# Patient Record
Sex: MALE | Race: BLACK OR AFRICAN AMERICAN | NOT HISPANIC OR LATINO | Employment: FULL TIME | URBAN - METROPOLITAN AREA
[De-identification: names, ages, dates, MRNs, and addresses within clinical notes are randomized per-mention and may not be internally consistent; named-entity substitution may affect disease eponyms.]

---

## 2017-07-13 ENCOUNTER — OFFICE VISIT (OUTPATIENT)
Dept: INTERNAL MEDICINE | Facility: CLINIC | Age: 37
End: 2017-07-13
Payer: COMMERCIAL

## 2017-07-13 VITALS
DIASTOLIC BLOOD PRESSURE: 106 MMHG | HEIGHT: 74 IN | SYSTOLIC BLOOD PRESSURE: 170 MMHG | WEIGHT: 304.25 LBS | HEART RATE: 72 BPM | BODY MASS INDEX: 39.05 KG/M2

## 2017-07-13 DIAGNOSIS — E55.9 VITAMIN D DEFICIENCY DISEASE: ICD-10-CM

## 2017-07-13 DIAGNOSIS — E66.01 SEVERE OBESITY WITH BODY MASS INDEX (BMI) OF 35.0 TO 39.9 WITH COMORBIDITY: ICD-10-CM

## 2017-07-13 DIAGNOSIS — I10 HTN (HYPERTENSION), BENIGN: ICD-10-CM

## 2017-07-13 DIAGNOSIS — R06.83 SNORING: ICD-10-CM

## 2017-07-13 DIAGNOSIS — E78.2 MIXED DYSLIPIDEMIA: ICD-10-CM

## 2017-07-13 PROCEDURE — 99999 PR PBB SHADOW E&M-EST. PATIENT-LVL IV: CPT | Mod: PBBFAC,,, | Performed by: INTERNAL MEDICINE

## 2017-07-13 PROCEDURE — 99204 OFFICE O/P NEW MOD 45 MIN: CPT | Mod: S$GLB,,, | Performed by: INTERNAL MEDICINE

## 2017-07-13 PROCEDURE — 4010F ACE/ARB THERAPY RXD/TAKEN: CPT | Mod: S$GLB,,, | Performed by: INTERNAL MEDICINE

## 2017-07-13 RX ORDER — LANCETS
1 EACH MISCELLANEOUS DAILY
Qty: 100 EACH | Refills: 11 | Status: SHIPPED | OUTPATIENT
Start: 2017-07-13

## 2017-07-13 RX ORDER — METFORMIN HYDROCHLORIDE 500 MG/1
1000 TABLET ORAL 2 TIMES DAILY
Qty: 360 TABLET | Refills: 1 | Status: SHIPPED | OUTPATIENT
Start: 2017-07-13 | End: 2018-10-12 | Stop reason: ALTCHOICE

## 2017-07-13 RX ORDER — ATORVASTATIN CALCIUM 40 MG/1
40 TABLET, FILM COATED ORAL DAILY
Qty: 90 TABLET | Refills: 3 | Status: SHIPPED | OUTPATIENT
Start: 2017-07-13 | End: 2018-10-12 | Stop reason: SDUPTHER

## 2017-07-13 RX ORDER — INSULIN PUMP SYRINGE, 3 ML
EACH MISCELLANEOUS
Qty: 1 EACH | Refills: 0 | Status: SHIPPED | OUTPATIENT
Start: 2017-07-13 | End: 2021-03-17

## 2017-07-13 RX ORDER — LANCING DEVICE
1 EACH MISCELLANEOUS DAILY
Qty: 1 EACH | Refills: 0 | Status: SHIPPED | OUTPATIENT
Start: 2017-07-13 | End: 2021-02-03

## 2017-07-13 RX ORDER — CHOLECALCIFEROL (VITAMIN D3) 125 MCG
5000 CAPSULE ORAL DAILY
Qty: 100 CAPSULE | Refills: 2 | COMMUNITY
Start: 2017-07-13 | End: 2018-10-12 | Stop reason: ALTCHOICE

## 2017-07-13 NOTE — PATIENT INSTRUCTIONS
Blood Pressure Measurement:    -- Please record your blood pressure every day. When checking, make sure you have been sitting for about 5 minutes, your legs are uncrossed, and the blood pressure cuff at the level of your heart. Record your blood pressure with the date and time in a log and bring it with you to every doctor's visit.  -- Goal blood pressure is top number less than 130 and bottom number less than 80.  -- If your blood pressure is >160/>100 on two consecutive occasions, contact the office. If it is >180/>120 and you are having confusion, chest pain or back pain, shortness of breath, or blood in the urine, go to the emergency room immediately.        Established High Blood Pressure    High blood pressure (hypertension) is a chronic disease. Often health care providers dont know what causes it. But it can be caused by certain health conditions and medicines.  If you have high blood pressure, you may not have any symptoms. If you do have symptoms, they may include headache, dizziness, changes in your vision, chest pain, and shortness of breath. But even without symptoms, high blood pressure thats not treated raises your risk for heart attack and stroke. High blood pressure is a serious health risk and shouldnt be ignored.  A blood pressure reading is made up of two numbers: a higher number over a lower number. The top number is the systolic pressure. The bottom number is the diastolic pressure. A normal blood pressure is less than 120 over less than 80.  High blood pressure is when either the top number is 140 or higher, or the bottom number is 90 or higher. This must be the result when taking your blood pressure a number of times. The blood pressures between normal and high are called prehypertension.  Home care  If you have high blood pressure, you should do what is listed below to lower your blood pressure. If you are taking medicines for high blood pressure, these methods may reduce or end your  need for medicines in the future.  · Begin a weight-loss program if you are overweight.  · Cut back on how much salt you get in your diet. Heres how to do this:  ¨ Dont eat foods that have a lot of salt. These include olives, pickles, smoked meats, and salted potato chips.  ¨ Dont add salt to your food at the table.  ¨ Use only small amounts of salt when cooking.  · Begin an exercise program. Talk with your health care provider about the type of exercise program that would be best for you. It doesn't have to be hard. Even brisk walking for 20 minutes 3 times a week is a good form of exercise.  · Dont take medicines that have heart stimulants. This includes many cold and sinus decongestant pills and sprays, as well as diet pills. Check the warnings about hypertension on the label. Stimulants such as amphetamine or cocaine could be lethal for someone with high blood pressure. Never take these.  · Limit how much caffeine you get in your diet. Switch to caffeine-free products.  · Stop smoking. If you are a long-time smoker, this can be hard. Enroll in a stop-smoking program to make it more likely that you will quit for good.  · Learn how to handle stress. This is an important part of any program to lower blood pressure. Learn about relaxation methods like meditation, yoga, or biofeedback.  · If your provider prescribed medicines, take them exactly as directed. Missing doses may cause your blood pressure get out of control.  · Consider buying an automatic blood pressure machine. You can get one of these at most pharmacies. Use this to watch your blood pressure at home. Give the results to your provider.  Follow-up care  You will need to make regular visits to your health care provider. This is to check your blood pressure and to make changes to your medicines. Make a follow-up appointment as directed.  When to seek medical advice  Call your health care provider right away if any of these occur:  · Chest pain or  shortness of breath  · Severe headache  · Throbbing or rushing sound in the ears  · Nosebleed  · Sudden severe pain in your belly (abdomen)  · Extreme drowsiness, confusion, or fainting  · Dizziness or dizziness with a spinning sensation (vertigo)  · Weakness of an arm or leg or one side of the face  · You have problems speaking or seeing   Date Last Reviewed: 11/25/2014  © 6239-8846 [employer]. [address] [place] [address], [place] [address]. All rights reserved. This information is not intended as a substitute for professional medical care. Always follow your healthcare professional's instructions.      Low-Salt Diet  This diet removes foods that are high in salt. It also limits the amount of salt you use when cooking. It is most often used for people with high blood pressure, edema (fluid retention), and kidney, liver, or heart disease.  Table salt contains the mineral sodium. Your body needs sodium to work normally. But too much sodium can make your health problems worse. Your healthcare provider is recommending a low-salt (also called low-sodium) diet for you. Your total daily allowance of salt is 1,500 to 2,300 milligrams (mg). It is less than 1 teaspoon of table salt. This means you can have only about 500 to 700 mg of sodium at each meal. People with certain health problems should limit salt intake to the lower end of the recommended range.    When you cook, dont add much salt. If you can cook without using salt, even better. Dont add salt to your food at the table.  When shopping, read food labels. Salt is often called sodium on the label. Choose foods that are salt-free, low salt, or very low salt. Note that foods with reduced salt may not lower your salt intake enough.    Beans, potatoes, and pasta  Ok: Dry beans, split peas, lentils, potatoes, rice, macaroni, pasta, spaghetti without added salt  Avoid: Potato chips, tortilla chips, and similar products  Breads and cereals  Ok: Low-sodium  breads, rolls, cereals, and cakes; low-salt crackers, matzo crackers  Avoid: Salted crackers, pretzels, popcorn, Romansh toast, pancakes, muffins  Dairy  Ok: Milk, chocolate milk, hot chocolate mix, low-salt cheeses, and yogurt  Avoid: Processed cheese and cheese spreads; Roquefort, Camembert, and cottage cheese; buttermilk, instant breakfast drink  Desserts  Ok: Ice cream, frozen yogurt, juice bars, gelatin, cookies and pies, sugar, honey, jelly, hard candy  Avoid: Most pies, cakes and cookies prepared or processed with salt; instant pudding  Drinks  Ok: Tea, coffee, fizzy (carbonated) drinks, juices  Avoid: Flavored coffees, electrolyte replacement drinks, sports drinks  Meats  Ok: All fresh meat, fish, poultry, low-salt tuna, eggs, egg substitute  Avoid: Smoked, pickled, brine-cured, or salted meats and fish. This includes obrien, chipped beef, corned beef, hot dogs, deli meats, ham, kosher meats, salt pork, sausage, canned tuna, salted codfish, smoked salmon, herring, sardines, or anchovies.  Seasonings and spices  Ok: Most seasonings are okay. Good substitutes for salt include: fresh herb blends, hot sauce, lemon, garlic, franz, vinegar, dry mustard, parsley, cilantro, horseradish, tomato paste, regular margarine, mayonnaise, unsalted butter, cream cheese, vegetable oil, cream, low-salt salad dressing and gravy.  Avoid: Regular ketchup, relishes, pickles, soy sauce, teriyaki sauce, Worcestershire sauce, BBQ sauce, tartar sauce, meat tenderizer, chili sauce, regular gravy, regular salad dressing, salted butter  Soups  Ok: Low-salt soups and broths made with allowed foods  Avoid: Bouillon cubes, soups with smoked or salted meats, regular soup and broth  Vegetables  Ok: Most vegetables are okay; also low-salt tomato and vegetable juices  Avoid: Sauerkraut and other brine-soaked vegetables; pickles and other pickled vegetables; tomato juice, olives  Date Last Reviewed: 8/1/2016  © 2648-8123 [employer]  [address] [place] [address]. All rights reserved. This information is not intended as a substitute for professional medical care. Always follow your healthcare professional's instructions.          Managing Diabetes: The A1C Test       Healthy red blood cells have some glucose stuck to them. A high A1C means that unhealthy amounts of glucose are stuck to the cells.   What is the A1C test?  Using your meter helps you track your blood sugar every day. But your glucose meter tells you the value at the time of testing only. You also need to know if your treatment plan is keeping you healthy over time. The hemoglobin A1C (or glycated hemoglobin) test can help. This test measures your average blood sugar level over a few months. A higher A1C result means that you have a higher risk of developing complications.  The A1C test  The A1C is a blood test done by your healthcare provider. You will likely have an A1C test every 3 to 6 months.  Your blood glucose goal  A1C has been shown as a percentage. But it can also be shown as a number representing the estimated Average Glucose (eAG). Unlike the A1C percentage, eAG is a number similar to the numbers listed on your daily glucose monitor. Both A1C and eAG measure the amount of glucose stuck to a protein called hemoglobin in red blood cells. Your healthcare provider will help you figure out what your ideal A1C or eAG should be. Your target number will depend on your age, general health, and other factors. If your current number is too high, your treatment plan may need changes, such as different medicines.  Sample results  Most people aim for an A1c lower than 7%. Thats an eAG less than 154 mg/dL. Or, your healthcare provider may want you to aim for an A1C of 6%. Thats an eAG of 126 mg/dL.     Glucose calculator  Visit http://[email or web address].diabetes.org/diapro/glucose_calc for a chart that helps convert your A1C percentages into eAG numbers.   Date Last Reviewed:  6/1/2016 © 2000-2016 [employer]. [address] [place] [address], [place] [address]. All rights reserved. This information is not intended as a substitute for professional medical care. Always follow your healthcare professional's instructions.      Healthy Meals for Diabetes     A healthcare provider will help you develop a meal plan that fits your needs.   Ask your healthcare team to help you make a meal plan that fits your needs. Your meal plan tells you when to eat your meals and snacks, what kinds of foods to eat, and how much of each food to eat. You dont have to give up all the foods you like. But you do need to follow some guidelines.  Choose healthy carbohydrates  Starches, sugars, and fiber are all types of carbohydrates. Fiber can help lower your cholesterol and triglycerides. Fiber is also healthy for your heart. You should have 20 to 35 grams of total fiber each day. Fiber-rich foods include:  · Whole-grain breads and cereals  · Bulgur wheat  · Brown rice     · Whole-wheat pasta  · Fruits and vegetables  · Dry beans, and peas   Keep track of the amount of carbohydrates you eat. This can help you keep the right balance of physical activity and medicine. The amount of carbohydrates needed will vary for each person. It depends on many things such as your health, the medicines you take, and how active you are. Your healthcare team will help you figure out the right amount of carbohydrates for you. You may start with around 45 to 60 grams of carbohydrates per meal, depending on your situation.   Here are some examples of foods containing about 15 grams of carbohydrates (1 serving of carbohydrates):  · 1/2 cup of canned or frozen fruit  · A small piece of fresh fruit (4 ounces)  · 1 slice of bread  · 1/2 cup of oatmeal  · 1/3 cup of rice  · 4 to 6 crackers  · 1/2 English muffin  · 1/2 cup of black beans  · 1/4 of a large baked potato (3 ounces)  · 2/3 cup of plain fat-free yogurt  · 1 cup of  soup  · 1/2 cup of casserole  · 6 chicken nuggets  · 2-inch-square brownie or cake without frosting  · 2 small cookies  · 1/2 cup of ice cream or sherbet  Choose healthy protein foods  Eating protein that is low in fat can help you control your weight. It also helps keep your heart healthy. Low-fat protein foods include:  · Fish  · Plant proteins, such as dry beans and peas, nuts, and soy products like tofu and soymilk  · Lean meat with all visible fat removed  · Poultry with the skin removed  · Low-fat or nonfat milk, cheese, and yogurt  Limit unhealthy fats and sugar  Saturated and trans fats are unhealthy for your heart. They raise LDL (bad) cholesterol. Fat is also high in calories, so it can make you gain weight. To cut down on unhealthy fats and sugar, limit these foods:  · Butter or margarine  · Palm and palm kernel oils and coconut oil  · Cream  · Cheese  · Cooney  · Lunch meats     · Ice cream  · Sweet bakery goods such as pies, muffins, and donuts  · Jams and jellies  · Candy bars  · Regular sodas   How much to eat  The amount of food you eat affects your blood sugar. It also affects your weight. Your healthcare team will tell you how much of each type of food you should eat.  · Use measuring cups and spoons and a food scale to measure serving sizes.  · Learn what a correct serving size looks like on your plate. This will help when you are away from home and cant measure your servings.  · Eat only the number of servings given on your meal plan for each food. Dont take seconds.  · Learn to read food labels. Be sure to look at serving size, total carbohydrates, fiber, calories, sugar, and saturated and trans fats. Look for healthier alternatives to foods that have added sugar.  · Plan ahead for parties so you can still have a good time without going overboard with unhealthy food choices. Set a good example yourself by bringing a healthy dish to pot lucks.   Choose healthy snacks  When it comes to snacks, we  usually think about foods with added sugar and fats. But there are many other options for healthier snack choices. Here are a few snack ideas to choose from:  Snacks with less than 5 grams of carbohydrates  · 1 piece of string cheese  · 3 celery sticks plus 1 tablespoon of peanut butter  · 5 cherry tomatoes plus 1 tablespoon of ranch dressing  · 1 hard-boiled egg  · 1/4 cup of fresh blueberries  ·  5 baby carrots  · 1 cup of light popcorn  · 1/2 cup of sugar-free gelatin  · 15 almonds  Snacks with about 10 to 20 grams of carbohydrates  · 1/3 cup of hummus plus 1 cup of fresh cut nonstarchy vegetables (carrots, green peppers, broccoli, celery, or a combination)  · 1/2 cup of fresh or canned fruit plus 1/4 cup of cottage cheese  · 1/2 cup of tuna salad with 4 crackers  · 2 rice cakes and a tablespoon of peanut butter  · 1 small apple or orange  · 3 cups light popcorn  · 1/2 of a turkey sandwich (1 slice of whole-wheat bread, 2 ounces of turkey, and mustard)  Portion sizes are important to controlling your blood sugar and staying at a healthy weight. Stock up on healthy snack items so you always have them on hand.  When to eat  Your meal plan will likely include breakfast, lunch, dinner, and some snacks.  · Try to eat your meals and snacks at about the same times each day.  · Eat all your meals and snacks. Skipping a meal or snack can make your blood sugar drop too low. It can also cause you to eat too much at the next meal or snack. Then your blood sugar could get too high.  Date Last Reviewed: 7/1/2016  © 0118-2665 [employer]. [address] [place] [address], [place] [address]. All rights reserved. This information is not intended as a substitute for professional medical care. Always follow your healthcare professional's instructions.

## 2017-07-13 NOTE — Clinical Note
Bina - new patient to me with uncontrolled DM and HTN; having him see you in ~4 weeks for close follow up on his BP after restarting lisinopril 40mg. Would restart HCTZ if BP is still above goal.  Thanks! Naeem Chamorro MD

## 2017-07-13 NOTE — PROGRESS NOTES
Subjective:       Patient ID: Claudio Herr is a 37 y.o. male.    Chief Complaint: Annual Exam and Medication Refill    HPI  38 y/o man with HTN, HLD, DM2, vitamin D deficiency here to establish care.  Out of medications for about a year.     DM2 with nephropathy - diagnosed 7/2011 with A1c 11.0. Has followed with Dr Lorenzo with Endocrine on & off since that time. Last visit 2/2016, at that time A1c was 7.7.   Not checking BG at home, doesn't know where his meter is.  Does drink cokes, though cut back on this recently from 2-3/day to 1/day.   Drinks copious water, not sure about polyuria.   No numbness or paresthesias.  Eyes checked 1.5-2 years ago, non-[facility] provider.     HTN - BP at last visit 190/90.  Had headache 2 days ago in setting of stress. Otherwise no chest pain or dyspnea, no vision changes.     HLD - had been on atorvastatin previously, not currently.    Vitamin D deficiency - at last Endocrine visit, recommended to take 5000IU daily. Not currently taking.    Obesity - does snore. Lost 20# since last visit but says his weight has been up & down -- loses weight with diet changes, regains easily.    Review of Systems   Constitutional: Negative for activity change, fatigue and fever.   HENT: Negative for congestion and sore throat.         +snoring   Eyes: Negative for visual disturbance.   Respiratory: Negative for cough and shortness of breath.    Cardiovascular: Negative for chest pain, palpitations and leg swelling.   Gastrointestinal: Negative for abdominal pain, blood in stool, constipation, diarrhea and nausea.   Endocrine: Positive for polydipsia and polyuria.   Genitourinary: Negative.    Musculoskeletal: Negative.    Skin: Negative for rash.   Neurological: Negative for weakness and numbness.   Psychiatric/Behavioral: Negative for dysphoric mood and sleep disturbance.         Past Medical History:   Diagnosis Date    Diabetes mellitus type II     Hyperlipidemia     Hypertension      "Vitamin D deficiency disease      History reviewed. No pertinent surgical history.  Family History   Problem Relation Age of Onset    Diabetes Mother     Hypertension Mother     Diabetes Sister     Hypertension Sister     Diabetes Maternal Grandmother     No Known Problems Son     No Known Problems Son     No Known Problems Son     No Known Problems Daughter        Social History   Substance Use Topics    Smoking status: Current Some Day Smoker     Packs/day: 0.10     Years: 8.00     Types: Cigarettes    Smokeless tobacco: Never Used      Comment: occasional smokes a cigarrette    Alcohol use Yes      Comment: occasional       Medications and allergies reviewed.     Objective:          Vitals:    07/13/17 0821 07/13/17 0845   BP: (!) 184/118 (!) 170/106   BP Location: Left arm    Patient Position: Sitting    BP Method: Manual Manual   Pulse: 72    Weight: (!) 138 kg (304 lb 3.8 oz)    Height: 6' 2" (1.88 m)      Body mass index is 39.06 kg/m².  Physical Exam   Constitutional: He is oriented to person, place, and time. He appears well-developed and well-nourished. No distress.   HENT:   Head: Normocephalic and atraumatic.   Mouth/Throat: Oropharynx is clear and moist.   mallampati 4   Eyes: Conjunctivae and EOM are normal. Pupils are equal, round, and reactive to light.   Neck: Normal range of motion. Neck supple.   Cardiovascular: Normal rate, regular rhythm, normal heart sounds and intact distal pulses.    No murmur heard.  Pulmonary/Chest: Effort normal and breath sounds normal. No respiratory distress.   Abdominal: Soft. Bowel sounds are normal. He exhibits no distension. There is no tenderness.   Musculoskeletal: Normal range of motion. He exhibits no edema or tenderness.   Lymphadenopathy:     He has no cervical adenopathy.   Neurological: He is alert and oriented to person, place, and time. He has normal strength. No cranial nerve deficit. Gait normal.   Skin: Skin is warm and dry.   Psychiatric: " He has a normal mood and affect.   Vitals reviewed.      Lab Results   Component Value Date    WBC 8.78 07/13/2011    HGB 14.4 07/13/2011    HCT 42.9 07/13/2011     07/13/2011    CHOL 194 02/22/2016    TRIG 154 (H) 02/22/2016    HDL 37 (L) 02/22/2016    ALT 28 02/22/2016    AST 21 02/22/2016     02/22/2016    K 4.0 02/22/2016     02/22/2016    CREATININE 1.0 02/22/2016    BUN 14 02/22/2016    CO2 28 02/22/2016    TSH 2.832 03/17/2014    HGBA1C 7.7 (H) 02/22/2016       Assessment:       1. Uncontrolled type 2 diabetes mellitus with microalbuminuria, without long-term current use of insulin    2. HTN (hypertension), benign    3. Mixed dyslipidemia    4. Vitamin D deficiency disease    5. Severe obesity with body mass index (BMI) of 35.0 to 39.9 with comorbidity    6. Snoring        Plan:   Claudio was seen today for annual exam and medication refill.    Diagnoses and all orders for this visit:    Uncontrolled type 2 diabetes mellitus with microalbuminuria, without long-term current use of insulin  Comments:  Restarting meds, sending to [facility]. He plans to work on weight loss with diet change; depending on success willing to see health  or Dr South.   Counseled re: DM goals, A1c test, DM health maintenance  Orders:  -     blood-glucose meter kit; Use as instructed  -     blood sugar diagnostic Strp; 1 strip by Misc.(Non-Drug; Combo Route) route once daily.  -     lancing device Misc; 1 Device by Misc.(Non-Drug; Combo Route) route once daily.  -     lancets Misc; 1 lancet by Misc.(Non-Drug; Combo Route) route once daily.  -     exenatide microspheres (BYDUREON) 2 mg SSRR; Inject 2 mg into the skin once a week.  -     metformin (GLUCOPHAGE) 500 MG tablet; Take 2 tablets (1,000 mg total) by mouth 2 (two) times daily. Taper up dose over 2 weeks  -     Ambulatory Referral to Optometry  -     lisinopril (PRINIVIL,ZESTRIL) 40 MG Tab; Take 1 tablet (40 mg total) by mouth once daily.  -      Ambulatory consult to Diabetic Education  -     Comprehensive metabolic panel; Future  -     Lipid panel; Future  -     Hemoglobin A1c; Future  -     CBC Without Differential; Future  -     MICROALBUMIN / CREATININE RATIO URINE; Future    HTN (hypertension), benign  Comments:  Uncontrolled. Will restart lisinopril and uptitrate to 40mg daily; he will get cuff and monitor. If still high after 2-4 weeks will add back HCTZ.   Orders:  -     lisinopril (PRINIVIL,ZESTRIL) 40 MG Tab; Take 1 tablet (40 mg total) by mouth once daily.  -     Comprehensive metabolic panel; Future  -     Lipid panel; Future  -     CBC Without Differential; Future    Mixed dyslipidemia  Comments:  restarting statin  Orders:  -     atorvastatin (LIPITOR) 40 MG tablet; Take 1 tablet (40 mg total) by mouth once daily.  -     Lipid panel; Future    Vitamin D deficiency disease  Comments:  will recheck level; will probably need supplementation  Orders:  -     cholecalciferol, vitamin D3, 5,000 unit capsule; Take 1 capsule (5,000 Units total) by mouth once daily.  -     Vitamin D; Future    Severe obesity with body mass index (BMI) of 35.0 to 39.9 with comorbidity  Comments:  counseled re: weight loss and health risks associated with obesity; he will work on diet changes    Snoring  Comments:  does snore; he will check with wife re: h/o apneic episodes; at risk for DANIELLA but wants to work on weight loss first. Counseled re: health risks of DANIELLA, effect on BP and weight.     Health maintenance reviewed with patient.   Plan to check on pneumovax, foot exam next visit.  Fasting labs tomorrow AM.  He will see Bina PRESCOTT in 4 weeks for BP cuff check, log review, close follow-up on HTN.  Return in about 3 months (around 10/13/2017) for diabetes, hypertension.    Naeem Chamorro MD  Internal Medicine  [facility]  7/13/2017    >45 minutes spent with patient with >50% of visit spent counseling patient regarding conditions  documented above and planning for care coordination. All questions answered.

## 2017-07-14 ENCOUNTER — LAB VISIT (OUTPATIENT)
Dept: LAB | Facility: HOSPITAL | Age: 37
End: 2017-07-14
Attending: INTERNAL MEDICINE
Payer: COMMERCIAL

## 2017-07-14 DIAGNOSIS — I10 HTN (HYPERTENSION), BENIGN: ICD-10-CM

## 2017-07-14 DIAGNOSIS — E78.2 MIXED DYSLIPIDEMIA: ICD-10-CM

## 2017-07-14 DIAGNOSIS — E55.9 VITAMIN D DEFICIENCY DISEASE: ICD-10-CM

## 2017-07-14 LAB
25(OH)D3+25(OH)D2 SERPL-MCNC: 24 NG/ML
ALBUMIN SERPL BCP-MCNC: 3.6 G/DL
ALP SERPL-CCNC: 86 U/L
ALT SERPL W/O P-5'-P-CCNC: 21 U/L
ANION GAP SERPL CALC-SCNC: 10 MMOL/L
AST SERPL-CCNC: 14 U/L
BILIRUB SERPL-MCNC: 0.3 MG/DL
BUN SERPL-MCNC: 15 MG/DL
CALCIUM SERPL-MCNC: 9.4 MG/DL
CHLORIDE SERPL-SCNC: 102 MMOL/L
CHOLEST/HDLC SERPL: 6.2 {RATIO}
CO2 SERPL-SCNC: 27 MMOL/L
CREAT SERPL-MCNC: 1.1 MG/DL
ERYTHROCYTE [DISTWIDTH] IN BLOOD BY AUTOMATED COUNT: 13.1 %
EST. GFR  (AFRICAN AMERICAN): >60 ML/MIN/1.73 M^2
EST. GFR  (NON AFRICAN AMERICAN): >60 ML/MIN/1.73 M^2
ESTIMATED AVG GLUCOSE: 260 MG/DL
GLUCOSE SERPL-MCNC: 214 MG/DL
HBA1C MFR BLD HPLC: 10.7 %
HCT VFR BLD AUTO: 41.1 %
HDL/CHOLESTEROL RATIO: 16 %
HDLC SERPL-MCNC: 243 MG/DL
HDLC SERPL-MCNC: 39 MG/DL
HGB BLD-MCNC: 13.5 G/DL
LDLC SERPL CALC-MCNC: 162.6 MG/DL
MCH RBC QN AUTO: 29.3 PG
MCHC RBC AUTO-ENTMCNC: 32.8 %
MCV RBC AUTO: 89 FL
NONHDLC SERPL-MCNC: 204 MG/DL
PLATELET # BLD AUTO: 251 K/UL
PMV BLD AUTO: 11.5 FL
POTASSIUM SERPL-SCNC: 3.9 MMOL/L
PROT SERPL-MCNC: 7.3 G/DL
RBC # BLD AUTO: 4.61 M/UL
SODIUM SERPL-SCNC: 139 MMOL/L
TRIGL SERPL-MCNC: 207 MG/DL
WBC # BLD AUTO: 7.27 K/UL

## 2017-07-14 PROCEDURE — 80053 COMPREHEN METABOLIC PANEL: CPT

## 2017-07-14 PROCEDURE — 80061 LIPID PANEL: CPT

## 2017-07-14 PROCEDURE — 83036 HEMOGLOBIN GLYCOSYLATED A1C: CPT

## 2017-07-14 PROCEDURE — 82306 VITAMIN D 25 HYDROXY: CPT

## 2017-07-14 PROCEDURE — 85027 COMPLETE CBC AUTOMATED: CPT

## 2017-07-14 PROCEDURE — 36415 COLL VENOUS BLD VENIPUNCTURE: CPT

## 2017-07-17 NOTE — PROGRESS NOTES
Labs reviewed. His A1c is quite high at 10.7 - it is important that he restart the metformin as we discussed, work on diet changes, and follow up with the diabetes empowerment team. We will recheck this in 3 months.   His LDL (bad cholesterol) is high - continue the atorvastatin, and work on diet changes. We will also need to recheck this in 2-3 months.  Vitamin D is a little low -- would recommend that he take 2000IU daily supplement.  Please call patient to inform and remind of upcoming appts.  Naeem Chamorro MD

## 2017-07-18 ENCOUNTER — TELEPHONE (OUTPATIENT)
Dept: INTERNAL MEDICINE | Facility: CLINIC | Age: 37
End: 2017-07-18

## 2017-07-18 NOTE — TELEPHONE ENCOUNTER
Pt informed of results and reminded of upcoming appointments, Pt did not any questions and verbalized understanding:        Labs reviewed. His A1c is quite high at 10.7 - it is important that he restart the metformin as we discussed, work on diet changes, and follow up with the diabetes empowerment team. We will recheck this in 3 months.   His LDL (bad cholesterol) is high - continue the atorvastatin, and work on diet changes. We will also need to recheck this in 2-3 months.   Vitamin D is a little low -- would recommend that he take 2000IU daily supplement.   Please call patient to inform and remind of upcoming appts.   Naeem Chamorro MD

## 2017-07-26 ENCOUNTER — PATIENT OUTREACH (OUTPATIENT)
Dept: DIABETES | Facility: CLINIC | Age: 37
End: 2017-07-26

## 2017-07-26 NOTE — PROGRESS NOTES
Pt did not keep diabetes education appt scheduled for today. Called to reschedule; however, pt's voice mailbox has not been set up - unable to leave message.

## 2017-08-10 ENCOUNTER — TELEPHONE (OUTPATIENT)
Dept: INTERNAL MEDICINE | Facility: CLINIC | Age: 37
End: 2017-08-10

## 2017-08-10 ENCOUNTER — CLINICAL SUPPORT (OUTPATIENT)
Dept: INTERNAL MEDICINE | Facility: CLINIC | Age: 37
End: 2017-08-10
Payer: COMMERCIAL

## 2017-08-10 ENCOUNTER — OFFICE VISIT (OUTPATIENT)
Dept: INTERNAL MEDICINE | Facility: CLINIC | Age: 37
End: 2017-08-10
Payer: COMMERCIAL

## 2017-08-10 ENCOUNTER — LAB VISIT (OUTPATIENT)
Dept: LAB | Facility: HOSPITAL | Age: 37
End: 2017-08-10
Payer: COMMERCIAL

## 2017-08-10 VITALS
HEART RATE: 82 BPM | HEIGHT: 74 IN | WEIGHT: 300.5 LBS | BODY MASS INDEX: 38.56 KG/M2 | SYSTOLIC BLOOD PRESSURE: 160 MMHG | DIASTOLIC BLOOD PRESSURE: 100 MMHG

## 2017-08-10 DIAGNOSIS — I10 HYPERTENSION, ESSENTIAL: ICD-10-CM

## 2017-08-10 DIAGNOSIS — Z23 NEED FOR TDAP VACCINATION: ICD-10-CM

## 2017-08-10 DIAGNOSIS — I10 HYPERTENSION, ESSENTIAL: Primary | ICD-10-CM

## 2017-08-10 DIAGNOSIS — I10 BENIGN ESSENTIAL HTN: Primary | ICD-10-CM

## 2017-08-10 LAB
ANION GAP SERPL CALC-SCNC: 10 MMOL/L
BUN SERPL-MCNC: 12 MG/DL
CALCIUM SERPL-MCNC: 9.8 MG/DL
CHLORIDE SERPL-SCNC: 105 MMOL/L
CO2 SERPL-SCNC: 28 MMOL/L
CREAT SERPL-MCNC: 1 MG/DL
EST. GFR  (AFRICAN AMERICAN): >60 ML/MIN/1.73 M^2
EST. GFR  (NON AFRICAN AMERICAN): >60 ML/MIN/1.73 M^2
GLUCOSE SERPL-MCNC: 118 MG/DL
POTASSIUM SERPL-SCNC: 4.1 MMOL/L
SODIUM SERPL-SCNC: 143 MMOL/L

## 2017-08-10 PROCEDURE — 90715 TDAP VACCINE 7 YRS/> IM: CPT | Mod: S$GLB,,, | Performed by: PHYSICIAN ASSISTANT

## 2017-08-10 PROCEDURE — 80048 BASIC METABOLIC PNL TOTAL CA: CPT

## 2017-08-10 PROCEDURE — 99214 OFFICE O/P EST MOD 30 MIN: CPT | Mod: 25,S$GLB,, | Performed by: PHYSICIAN ASSISTANT

## 2017-08-10 PROCEDURE — 90732 PPSV23 VACC 2 YRS+ SUBQ/IM: CPT | Mod: S$GLB,,, | Performed by: PHYSICIAN ASSISTANT

## 2017-08-10 PROCEDURE — 3008F BODY MASS INDEX DOCD: CPT | Mod: S$GLB,,, | Performed by: PHYSICIAN ASSISTANT

## 2017-08-10 PROCEDURE — 36415 COLL VENOUS BLD VENIPUNCTURE: CPT

## 2017-08-10 PROCEDURE — 4010F ACE/ARB THERAPY RXD/TAKEN: CPT | Mod: S$GLB,,, | Performed by: PHYSICIAN ASSISTANT

## 2017-08-10 PROCEDURE — 90471 IMMUNIZATION ADMIN: CPT | Mod: S$GLB,,, | Performed by: PHYSICIAN ASSISTANT

## 2017-08-10 PROCEDURE — 3077F SYST BP >= 140 MM HG: CPT | Mod: S$GLB,,, | Performed by: PHYSICIAN ASSISTANT

## 2017-08-10 PROCEDURE — 99999 PR PBB SHADOW E&M-EST. PATIENT-LVL IV: CPT | Mod: PBBFAC,,, | Performed by: PHYSICIAN ASSISTANT

## 2017-08-10 PROCEDURE — 90472 IMMUNIZATION ADMIN EACH ADD: CPT | Mod: S$GLB,,, | Performed by: PHYSICIAN ASSISTANT

## 2017-08-10 PROCEDURE — 3080F DIAST BP >= 90 MM HG: CPT | Mod: S$GLB,,, | Performed by: PHYSICIAN ASSISTANT

## 2017-08-10 PROCEDURE — 3046F HEMOGLOBIN A1C LEVEL >9.0%: CPT | Mod: S$GLB,,, | Performed by: PHYSICIAN ASSISTANT

## 2017-08-10 RX ORDER — HYDROCHLOROTHIAZIDE 25 MG/1
25 TABLET ORAL DAILY
Qty: 30 TABLET | Refills: 11 | Status: SHIPPED | OUTPATIENT
Start: 2017-08-10 | End: 2018-10-12 | Stop reason: SDUPTHER

## 2017-08-10 NOTE — PATIENT INSTRUCTIONS
Take HCTZ 12.5 mg 2 tablets until you run out and then start new prescription sent to your [facility] for HCTZ 25 mg daily.          Out of Control High Blood Pressure (Established)    Your blood pressure was unusually high today. This can occur if youve missed doses of your blood pressure medicine. Or it can happen if you are taking other medicines. These include some asthma inhalers, decongestants, diet pills, and street drugs like cocaine and amphetamine.  Other causes include:  · Weight gain  · More salt in your diet  · Smoking  · Caffeine  Your blood pressure can also rise if you are emotionally upset or in intense pain. It may go back to normal after a period of rest.  A blood pressure reading is made up of 2 numbers. There is a top number over a bottom number. The top number is the systolic pressure. The bottom number is the diastolic pressure. A normal blood pressure is less than 120 over less than 80. High blood pressure (hypertension) is when the top number is 140 or higher. Or it is when the bottom number is 90 or higher. To be high blood pressure, the numbers must be higher when tested over a period of time. The blood pressures between normal and hypertension are called prehypertension.  Home care  Its important to take steps to lower your blood pressure. If you are taking blood pressure medicine, the guidelines below may help you need less or no medicines in the future.  · Begin a weight-loss program if you are overweight.  · Cut back on the amount of salt in your diet:  ¨ Avoid high-salt foods like olives, pickles, smoked meats, and salted potato chips.  ¨ Dont add salt to your food at the table.  ¨ Use only small amounts of salt when cooking.  · Begin an exercise program. Talk with your health care provider about what exercise program is best for you. It doesnt have to be difficult. Even brisk walking for 20 minutes 3 times a week is a good form of exercise.  · Avoid medicines that have heart  stimulants in them. This includes many cold and sinus decongestant pills and sprays, as well as diet pills. Check the warnings about hypertension on the label. Stimulants such as amphetamine or cocaine could be lethal for someone with hypertension. Never take these.  · Limit how much caffeine you drink. Or switch to noncaffeinated beverages.  · Stop smoking. If you are a long-time smoker, this can be hard. Enroll in a stop-smoking program to make it more likely that you will succeed. Talk with your provider about ways to quit.  · Learn how to handle stress better. This is an important part of any program to lower blood pressure. Learn ways to relax. These include meditation, yoga, and biofeedback.  · If medicines were prescribed, take them exactly as directed. Missing doses may cause your blood pressure to get out of control.  · Consider buying an automatic blood pressure machine. These are available at many drugstores. Use this to monitor your blood pressure. Report the results to your provider.  Follow-up care  Regular visits to your own health care provider for blood pressure and medicine checks are an important part of your care. Make a follow-up appointment as directed.  When to seek medical advice  Call your health care provider right away if any of these occur:  · Chest, arm, shoulder, neck, or upper back pain  · Shortness of breath  · Severe headache  · Throbbing or rushing sound in the ears  · Nosebleed  · Extreme drowsiness, confusion, or fainting  · Dizziness or dizziness with spinning sensation (vertigo)  · Weakness in an arm or leg or on one side of the face  · Difficulty speaking or seeing   Date Last Reviewed: 11/25/2014  © 1022-6313 [employer]. [address] [place] [address], [place] [address]. All rights reserved. This information is not intended as a substitute for professional medical care. Always follow your healthcare professional's instructions.

## 2017-08-10 NOTE — TELEPHONE ENCOUNTER
Please call patient.  Lab work showed acceptable kidney function.  Please repeat the lab in 2 weeks after starting the new BP medication (HCTZ).  BMP ordered-please schedule.

## 2017-08-10 NOTE — PROGRESS NOTES
Subjective:       Patient ID: Claudio Herr is a 37 y.o. male.        Chief Complaint: Hypertension    Claudio Herr is an established patient of Naeem Chamorro MD here today for 4 week f/u visit.    HTN-taking Lisinopril 40 mg and HCTZ 12.5 mg.  Not checking BP at home.      DM-not checking blood sugar, missed diabetes education, missed eye exam.  Has not picked up glucometer.      He is motivated today in regard to improving his health.             Review of Systems   Constitutional: Negative for appetite change, chills, fatigue and fever.   HENT: Negative for congestion and sore throat.    Eyes: Negative for visual disturbance.   Respiratory: Negative for cough, chest tightness and shortness of breath.    Cardiovascular: Negative for chest pain, palpitations and leg swelling.   Gastrointestinal: Negative for abdominal pain, blood in stool, constipation, diarrhea, nausea and vomiting.   Genitourinary: Negative for dysuria, frequency, hematuria and urgency.   Musculoskeletal: Negative for arthralgias and back pain.   Skin: Negative for rash.   Neurological: Negative for dizziness, syncope, weakness and headaches.   Psychiatric/Behavioral: Negative for dysphoric mood and sleep disturbance. The patient is not nervous/anxious.        Objective:      Physical Exam   Constitutional: He appears well-developed and well-nourished. No distress.   HENT:   Head: Normocephalic and atraumatic.   Right Ear: Tympanic membrane, external ear and ear canal normal.   Left Ear: Tympanic membrane, external ear and ear canal normal.   Nose: Nose normal.   Mouth/Throat: Oropharynx is clear and moist.   Eyes: Conjunctivae are normal. Pupils are equal, round, and reactive to light.   Cardiovascular: Normal rate, regular rhythm and normal heart sounds.  Exam reveals no gallop and no friction rub.    No murmur heard.  Pulmonary/Chest: Effort normal and breath sounds normal. No respiratory distress.   Abdominal: Soft. There is no  "tenderness. There is no rebound and no guarding.   Musculoskeletal: He exhibits no edema.   Neurological: He is alert.   Skin: Skin is warm and dry. He is not diaphoretic.   Psychiatric: He has a normal mood and affect.   Nursing note and vitals reviewed.      Assessment:       1. Hypertension, essential    2. Uncontrolled type 2 diabetes mellitus with complication, without long-term current use of insulin    3. Need for Tdap vaccination        Plan:       Claudio was seen today for hypertension.    Diagnoses and all orders for this visit:    Hypertension, essential-continue Lisinopril 40 mg daily, increase HCTZ from 12.5 to 25 mg daily  -     hydrochlorothiazide (HYDRODIURIL) 25 MG tablet; Take 1 tablet (25 mg total) by mouth once daily.  -     Basic metabolic panel; Future-check in 2 weeks    Uncontrolled type 2 diabetes mellitus with complication, without long-term current use of insulin-reschedule diabetes education for placement with diabetes empowerment, reschedule eye exam, given one touch verio IQ glucometer in clinic today  -     Pneumococcal Polysaccharide Vaccine (23 Valent) (SQ/IM)    Need for Tdap vaccination  -     Tdap Vaccine    Pt has been given instructions populated from [employer] database and has verbalized understanding of the after visit summary and information contained wherein.    Follow up with a primary care provider. May go to ER for acute shortness of breath, lightheadedness, fever, or any other emergent complaints or changes in condition.    "This note will be shared with the patient"    Future Appointments  Date Time Provider Department Center   10/13/2017 8:00 AM Naeem Chamorro MD [facility]               "

## 2017-08-14 ENCOUNTER — TELEPHONE (OUTPATIENT)
Dept: INTERNAL MEDICINE | Facility: CLINIC | Age: 37
End: 2017-08-14

## 2017-08-14 VITALS — WEIGHT: 300.5 LBS | BODY MASS INDEX: 38.58 KG/M2

## 2017-08-14 NOTE — PROGRESS NOTES
Date:  8/10/17                    Time: 0800  Initial Health  Contact - [x]Clinic visit  []  Phone Visit  ASSEMENT: Information obtained through combination of chart review prior to seeing patient, patient self-report.   Primary Referral diagnosis/reason for referral:     Meter instruction    Other:    (See physician progress note for complete list of diagnoses.)  PCP:   Dr. Chamorro     Referent :  [] PCP       [] Transition Navigator    []  EITZ    []  ISIDRO  [x]  Other:  Bina Ennis NP    Supports:  Wife, mom and kids        Preferred Learning Style  (may be a combination)  []  Visual      [] Auditory     []  Reading    [x]  Hands-on    []  1:1    []  Group        []  Alone       []  No preference   []  Combination  []  Other:         Presenting issues from patients point of view:  [x]  Improve nutrition/increase healthy choices.                    []  Improve /maintain current functioning.  []  Obtain and maintain healthy blood pressure.                  [x]  Stop smoking. 1/2 ppd  [x]  Improved weight management.                                       [x]  Lower cholesterol.  [x]  Improved blood glucose management.                            [x]  Improve breathing.  [x]  Increase energy level.                                                      []  Increase/maintain independence.  [x]  Improve sleep.                                                                [x]  Decrease stress.  []  Improve pain management.                                             [x]  Improve mood.  []  Other:                  Pt. Education Level:    12th        Disease specific education services  attended:    Diabetes class 5 yrs ago       Patient Employed:  [x]yes    [] No  Days and Hours:     [employer] 6 am -230 pm           Current Self-help Behaviors  []  Checks glucose level        times a day.  [x]  Tries to modify meals so more healthy.  []  Exercises by        []  Takes BP        times a       (insert  frequency)  []  Weighs self         (how often)  []  Takes all medications as prescribed.  []  Rarely misses health care appointments.  []  Sleeps 8 hours without waking more than twice.  []  Consistently wears/uses functioning aids as recommended  (ie glasses, hearing  aid, prosthesis,  walker,  wheelchair etc.)  []  Regularly engages in stress management activities I.e.:  []  Quit smoking   []  Purposefully educates self about health issues.  []  Pt did bring glucose log with him/her.  []  Other  (explain)           []  Comments:       []  Reported Blood Sugar Readings:          Health screenings   Last PCP visit:   7/13/17   Lipids: 7/14/17   CMP: 7/14/17   HgA1C: 7/14/17  10.7   Urine Microalbumin-Creatinine:      2/24/16       Eye Exam:   2 yrs ago ordered and missed appt   Foot Exam:     2/24/16     Strengths:  [x]  Confident                      [x]  Determined/persistent           [x]  Enthusiastic         [x]  Good problem solving          [x]  Good self-control                   [x]  Hard working        [x]  Hopeful/optimistic                 [x]  Intelligent                                [x]  Pos support network  [x]  Self-aware                             [x]  Creative         [x]  Flexible  [x] Sense of humor                     []  Open/willing       [x]  Spiritual                                 [x] Values health    [x]  Successful overcoming difficult challenges in the past.     [x]   Health literate (The degree to which individuals have the capacity to obtain, process, and    understand basic health information and services needed to make appropriate health decisions.- Dept. of Health and Human services.)  []   Other Comments:             Change Markers  Scale 1-10 with 10 representing most important, most confident.     [] NA at this time.   Readiness:   10  ;  Importance:  10   ;  Confidence:  10      INTERVENTIONS:  [x] Given an explanation about health coaching program and invited  participation.  [x] Listened reflectively; provided support, encouragement, and validation; respected  and maintained patient self-direction; explored pros/cons of change; personalized health risks of maintaining the status quo; and facilitated recognition of discrepancy between current behaviors and patients goals and values.  [x] Assessed stage of change and employed appropriate motivational interviewing strategies to facilitate movement toward the next stage of change and healthy behavior modification.  [x] Normalized occurrence of relapse, helped patient recognize outcome of new self-learning as a result of the relapse such as triggers, and helped focus on factors to reduce chances of returning to previous behaviors .  [x] Used readiness scale ratings to guide assessment of importance and confidence of successfully reaching goals.  [x] Assisted patient to develop goal, action plan, and address potential barriers to goal     achievement.  [] Patient was given a new (insert glucose meter or other supplies), taught to use, and      successfully demonstrated ability to carry out essential steps of process.    [] Rx for ( monitor/supplies, pen needles, etc.) was obtained.  [x] Provided educational review, written materials/handouts (Meal Planning Counting Carbs, Healthy Plate, 10 Rules for Eating Safely for Life, 10 Tips to Cutting Your Cravings, Probiotics).   [x] Topics discussed/provided:    GI of foods and how it affects your metabolism and helps you burn fat, carb counting    [x] Facilitated completion of needed exams and screenings by reviewing Diabetic/Health Maintenance Report Card with patient.  [x] Provided pt with my business card.  [x] Informed of/reviewed how to access health  and after hours assistance.  [x] Discussed, planned, and scheduled future contacts.  [x]Challenges/Barriers identified discussed as identified by patient.  [x] Needs identified by this Health :    Education and support      [x] Other:   Glucometer education and material provided:  1. Provided pt with Glucometer:   [x] One Touch Verio  [] Ultra 2 Mini with Delica Needles  [] Contour or Contour NEXT EZ Meter  [] Contour Breeze   [] Contour USB  [] Accu-Check Valeria   [] Accu-Chek Josefa  [] Accu-Check Compak  [] Free-Style  2. Visual and hands-on demonstration of blood sugar check.   3. Pt instructed to use a new lancet with each use; wash hands or use alcohol swab before puncturing finger with lancet; use an adequate supply of blood.   4. Times to perform glucose check as per MD orders, as indicated by medication use and as part of treatment for hypoglycemia.   5. Document readings and bring these readings to the next MD visit.   6. What is hypoglycemia and treatment.   7. Pt was able to accurately return demonstration of blood sugar check.   8. Reading obtained was  128    .   9. This reading is:   [x] FBS; [] post meal; [] Other:       .  Pt voiced understanding of glucometer use and purpose.            For additional care management support patient was referred to:        []  Community resources for                        []  Medication assistance programs               []  Dietician              []  Diabetes  [employer]                                        []  Mental health services                           []                  []  Diabetes [place]                                              []  Home health services                                []  Complex case management              []  PCP/covering provider due to                 []  Emergency Dept.                                  []  GYN              []  Optometrist/ Ophthalmologist                          []  Podiatrist                                                      [x]  N/A        []  Other:             RESPONSE/IMPRESSION  Behavior is consistent with the following stage of change:  []  Pre-contemplation  []  Contemplation  [x]  Preparation  []   Action  []  Maintenance  []  Relapse    Level of participation:  []  Refused to participate  []  Guarded / resistant  []  Passive participant  [x]  Active participant/interactive  [x]  Interested/receptive  [x]  Self-motivated  []  Other          Goal developed by patient today:  10/10  Stop eating junk food  Ride bike 3 x week  No more coke's  Schedule eye appt and Diabetes appts today    Plan (needed actions to accomplish goal):          [x] Pt will work on action plan as stated above.        [x] Pt will follow up with Health  on  8/24/17  At 1530.    [x] Health  will remain available.  [x] Health  will maintain regular contacts with patient to educate, support, encourage; help problem-solve; assist with development of self-advocacy/increasing independent health management; and provide resources as needed.  [] Pt has declined Health  Program at this time. Provided pt with Health  Program information should they decide to participate at a later time.        [] Pt to facilitate contact with Health        [] Health  to facilitate contact with patient.        [] Other:          Notes:   Met with patient instruction on new glucometer given.   Patient is interested in losing weight about 100 lbs to be able to get off medicine.   His most important reasons are to live longer, play football with his kids (4) and to live healthier.   He has started making changes recently and has lost 4 lbs in last month and has stopped drinking sodas and fried foods.   Goals set by patient and will continue to assist to help him reach his goals.     Best Method of Contact:  [] email:   [x] Phone:   [] Mail  [] Office     Priya Garcia RN

## 2017-08-14 NOTE — TELEPHONE ENCOUNTER
Called patient states he is doing well. States his glucose has been running 138-118 has been checking every other day.   He states he still is having no cokes but did have a coke zero the other day.   He states he also is having no bread and is eating mostly chicken and vegetables.  Reminded of upcoming appts eye appt tomorrow and request to bring glucometer in at his appt with Health  and Diabetes educator.   Encouraged to call with any questions or problems   Voiced understanding.   ..Priya Garcia RN HC

## 2017-08-15 ENCOUNTER — OFFICE VISIT (OUTPATIENT)
Dept: OPTOMETRY | Facility: CLINIC | Age: 37
End: 2017-08-15
Payer: COMMERCIAL

## 2017-08-15 ENCOUNTER — TELEPHONE (OUTPATIENT)
Dept: INTERNAL MEDICINE | Facility: CLINIC | Age: 37
End: 2017-08-15

## 2017-08-15 DIAGNOSIS — H52.203 MYOPIA WITH ASTIGMATISM, BILATERAL: ICD-10-CM

## 2017-08-15 DIAGNOSIS — H52.13 MYOPIA WITH ASTIGMATISM, BILATERAL: ICD-10-CM

## 2017-08-15 PROCEDURE — 92134 CPTRZ OPH DX IMG PST SGM RTA: CPT | Mod: S$GLB,,, | Performed by: OPTOMETRIST

## 2017-08-15 PROCEDURE — 92004 COMPRE OPH EXAM NEW PT 1/>: CPT | Mod: S$GLB,,, | Performed by: OPTOMETRIST

## 2017-08-15 PROCEDURE — 92015 DETERMINE REFRACTIVE STATE: CPT | Mod: S$GLB,,, | Performed by: OPTOMETRIST

## 2017-08-15 PROCEDURE — 99999 PR PBB SHADOW E&M-EST. PATIENT-LVL II: CPT | Mod: PBBFAC,,, | Performed by: OPTOMETRIST

## 2017-08-15 NOTE — TELEPHONE ENCOUNTER
Spoke to patient informed he needed labs done per Bina Ennis NP,  He states he can do when he comes to  appt. Scheduled for 8/24/17 at 1520.  ..Priya Garcia RN HC

## 2017-08-15 NOTE — LETTER
August 15, 2017      Naeem Chamorro MD  [address]  [facility] [address]           [facility]  [address]  [facility] [address]  Phone: [phone]  Fax: [phone]          Patient: Claudio Herr   MR Number: [id number]   YOB: 1980   Date of Visit: 8/15/2017       Dear Dr. Naeem Chamorro:    Thank you for referring Claudio Herr to me for evaluation. Attached you will find relevant portions of my assessment and plan of care.    If you have questions, please do not hesitate to call me. I look forward to following Claudio Herr along with you.    Sincerely,    Annika Hilton, OD    Enclosure  CC:  No Recipients    If you would like to receive this communication electronically, please contact [email or web address] or [phone] to request more information on [employer] access.    For providers and/or their staff who would like to refer a patient to [facility], please contact us through our one-stop-shop provider referral line, [facility] , at [phone].    If you feel you have received this communication in error or would no longer like to receive these types of communications, please e-mail [email or web address]

## 2017-08-15 NOTE — PROGRESS NOTES
HPI     Patient's last dilated exam was: 2 years ago  Pt here for diabetic health check, denies changes in vision. Patient   denies flashes, floaters, pain and double vision. IDDMx 5 years. Not using   any gtts did not test blood sugar today, yesterday it was 148    Hemoglobin A1C       Date                     Value               Ref Range             Status                07/14/2017               10.7 (H)            4.0 - 5.6 %           Final                 02/22/2016               7.7 (H)             4.5 - 6.2 %           Final                 03/13/2015               9.0 (H)             4.5 - 6.2 %           Final            ----------      Last edited by Kamla Quintanilla, PCT on 8/15/2017 10:28 AM.   (History)            Assessment /Plan     For exam results, see Encounter Report.    Uncontrolled type 2 diabetes mellitus with microalbuminuria, with long-term current use of insulin  -     OCT- Retina    Myopia with astigmatism, bilateral            1.  No retinopathy--monitor yearly.   Educated pt eye health correlates with blood sugar control.    2.  Rx given        RTC 1 year for dm exam.

## 2017-08-15 NOTE — LETTER
August 15, 2017      Claudio Herr   [address] [address]             [facility]  [address]  [place] [address]  Phone: [phone]  Fax: [phone] Claudio Herr    Was treated here on 08/15/2017    May Return to work on 08/15/2017    No Restrictions      Annika Hilton, OD

## 2017-08-24 ENCOUNTER — TELEPHONE (OUTPATIENT)
Dept: INTERNAL MEDICINE | Facility: CLINIC | Age: 37
End: 2017-08-24

## 2017-08-24 NOTE — TELEPHONE ENCOUNTER
Attempt to call patient to r/s missed appt and missed lab appt. No answer and unable to leave vm.   ..Priya Garcia RN HC

## 2018-02-06 DIAGNOSIS — E11.9 TYPE 2 DIABETES MELLITUS WITHOUT COMPLICATION: ICD-10-CM

## 2018-06-29 ENCOUNTER — PATIENT OUTREACH (OUTPATIENT)
Dept: ADMINISTRATIVE | Facility: HOSPITAL | Age: 38
End: 2018-06-29

## 2018-06-29 NOTE — PROGRESS NOTES
Patient due for an annual PCP appointment.  Contacted patient and able to schedule appointment on 10/12/18 at 8am.

## 2018-10-10 ENCOUNTER — TELEPHONE (OUTPATIENT)
Dept: INTERNAL MEDICINE | Facility: CLINIC | Age: 38
End: 2018-10-10

## 2018-10-10 DIAGNOSIS — E11.65 UNCONTROLLED TYPE 2 DIABETES MELLITUS WITH HYPERGLYCEMIA: ICD-10-CM

## 2018-10-10 DIAGNOSIS — I10 HTN (HYPERTENSION), BENIGN: ICD-10-CM

## 2018-10-10 DIAGNOSIS — E78.5 HYPERLIPIDEMIA, UNSPECIFIED HYPERLIPIDEMIA TYPE: Primary | ICD-10-CM

## 2018-10-10 DIAGNOSIS — D64.9 MILD ANEMIA: ICD-10-CM

## 2018-10-10 DIAGNOSIS — E78.5 HYPERLIPIDEMIA, UNSPECIFIED HYPERLIPIDEMIA TYPE: ICD-10-CM

## 2018-10-10 DIAGNOSIS — E11.65 UNCONTROLLED TYPE 2 DIABETES MELLITUS WITH HYPERGLYCEMIA: Primary | ICD-10-CM

## 2018-10-10 DIAGNOSIS — E55.9 VITAMIN D DEFICIENCY: ICD-10-CM

## 2018-10-10 NOTE — TELEPHONE ENCOUNTER
Pt with upcoming appt, overdue for follow up. Needs labs. Verbal orders were pended to me; have cancelled these and put in full set of orders that I would like him to have done. Please call patient to schedule - can do these tomorrow fasting if possible, or if needed can do them on day of appt.

## 2018-10-11 ENCOUNTER — LAB VISIT (OUTPATIENT)
Dept: LAB | Facility: HOSPITAL | Age: 38
End: 2018-10-11
Attending: INTERNAL MEDICINE
Payer: COMMERCIAL

## 2018-10-11 DIAGNOSIS — E78.5 HYPERLIPIDEMIA, UNSPECIFIED HYPERLIPIDEMIA TYPE: ICD-10-CM

## 2018-10-11 DIAGNOSIS — D64.9 MILD ANEMIA: ICD-10-CM

## 2018-10-11 DIAGNOSIS — E55.9 VITAMIN D DEFICIENCY: ICD-10-CM

## 2018-10-11 DIAGNOSIS — E11.65 UNCONTROLLED TYPE 2 DIABETES MELLITUS WITH HYPERGLYCEMIA: ICD-10-CM

## 2018-10-11 DIAGNOSIS — I10 HTN (HYPERTENSION), BENIGN: ICD-10-CM

## 2018-10-11 LAB
25(OH)D3+25(OH)D2 SERPL-MCNC: 16 NG/ML
ALBUMIN SERPL BCP-MCNC: 4 G/DL
ALP SERPL-CCNC: 74 U/L
ALT SERPL W/O P-5'-P-CCNC: 24 U/L
ANION GAP SERPL CALC-SCNC: 9 MMOL/L
AST SERPL-CCNC: 22 U/L
BILIRUB SERPL-MCNC: 0.3 MG/DL
BUN SERPL-MCNC: 14 MG/DL
CALCIUM SERPL-MCNC: 9.6 MG/DL
CHLORIDE SERPL-SCNC: 105 MMOL/L
CHOLEST SERPL-MCNC: 204 MG/DL
CHOLEST/HDLC SERPL: 5.1 {RATIO}
CO2 SERPL-SCNC: 25 MMOL/L
CREAT SERPL-MCNC: 1 MG/DL
ERYTHROCYTE [DISTWIDTH] IN BLOOD BY AUTOMATED COUNT: 14.2 %
EST. GFR  (AFRICAN AMERICAN): >60 ML/MIN/1.73 M^2
EST. GFR  (NON AFRICAN AMERICAN): >60 ML/MIN/1.73 M^2
ESTIMATED AVG GLUCOSE: 137 MG/DL
GLUCOSE SERPL-MCNC: 153 MG/DL
HBA1C MFR BLD HPLC: 6.4 %
HCT VFR BLD AUTO: 41.5 %
HDLC SERPL-MCNC: 40 MG/DL
HDLC SERPL: 19.6 %
HGB BLD-MCNC: 13.5 G/DL
LDLC SERPL CALC-MCNC: 139.6 MG/DL
MCH RBC QN AUTO: 29.8 PG
MCHC RBC AUTO-ENTMCNC: 32.5 G/DL
MCV RBC AUTO: 92 FL
NONHDLC SERPL-MCNC: 164 MG/DL
PLATELET # BLD AUTO: 241 K/UL
PMV BLD AUTO: 11.1 FL
POTASSIUM SERPL-SCNC: 3.8 MMOL/L
PROT SERPL-MCNC: 7.7 G/DL
RBC # BLD AUTO: 4.53 M/UL
SODIUM SERPL-SCNC: 139 MMOL/L
TRIGL SERPL-MCNC: 122 MG/DL
WBC # BLD AUTO: 8.83 K/UL

## 2018-10-11 PROCEDURE — 82306 VITAMIN D 25 HYDROXY: CPT

## 2018-10-11 PROCEDURE — 80061 LIPID PANEL: CPT

## 2018-10-11 PROCEDURE — 85027 COMPLETE CBC AUTOMATED: CPT

## 2018-10-11 PROCEDURE — 36415 COLL VENOUS BLD VENIPUNCTURE: CPT

## 2018-10-11 PROCEDURE — 83036 HEMOGLOBIN GLYCOSYLATED A1C: CPT

## 2018-10-11 PROCEDURE — 80053 COMPREHEN METABOLIC PANEL: CPT

## 2018-10-12 ENCOUNTER — OFFICE VISIT (OUTPATIENT)
Dept: INTERNAL MEDICINE | Facility: CLINIC | Age: 38
End: 2018-10-12
Payer: COMMERCIAL

## 2018-10-12 DIAGNOSIS — R80.9 TYPE 2 DIABETES MELLITUS WITH MICROALBUMINURIA, WITHOUT LONG-TERM CURRENT USE OF INSULIN: ICD-10-CM

## 2018-10-12 DIAGNOSIS — I10 HTN (HYPERTENSION), BENIGN: ICD-10-CM

## 2018-10-12 DIAGNOSIS — Z00.00 ANNUAL PHYSICAL EXAM: Primary | ICD-10-CM

## 2018-10-12 DIAGNOSIS — E78.2 MIXED DYSLIPIDEMIA: ICD-10-CM

## 2018-10-12 DIAGNOSIS — E11.29 TYPE 2 DIABETES MELLITUS WITH MICROALBUMINURIA, WITHOUT LONG-TERM CURRENT USE OF INSULIN: ICD-10-CM

## 2018-10-12 DIAGNOSIS — E55.9 VITAMIN D DEFICIENCY: ICD-10-CM

## 2018-10-12 DIAGNOSIS — E66.01 SEVERE OBESITY WITH BODY MASS INDEX (BMI) OF 35.0 TO 39.9 WITH COMORBIDITY: ICD-10-CM

## 2018-10-12 PROCEDURE — 3077F SYST BP >= 140 MM HG: CPT | Mod: CPTII,S$GLB,, | Performed by: INTERNAL MEDICINE

## 2018-10-12 PROCEDURE — 3080F DIAST BP >= 90 MM HG: CPT | Mod: CPTII,S$GLB,, | Performed by: INTERNAL MEDICINE

## 2018-10-12 PROCEDURE — 99395 PREV VISIT EST AGE 18-39: CPT | Mod: S$GLB,,, | Performed by: INTERNAL MEDICINE

## 2018-10-12 PROCEDURE — 99999 PR PBB SHADOW E&M-EST. PATIENT-LVL IV: CPT | Mod: PBBFAC,,, | Performed by: INTERNAL MEDICINE

## 2018-10-12 PROCEDURE — 3044F HG A1C LEVEL LT 7.0%: CPT | Mod: CPTII,S$GLB,, | Performed by: INTERNAL MEDICINE

## 2018-10-12 RX ORDER — ERGOCALCIFEROL 1.25 MG/1
50000 CAPSULE ORAL
Qty: 12 CAPSULE | Refills: 1 | Status: SHIPPED | OUTPATIENT
Start: 2018-10-12 | End: 2019-10-15 | Stop reason: SDUPTHER

## 2018-10-12 RX ORDER — ATORVASTATIN CALCIUM 40 MG/1
40 TABLET, FILM COATED ORAL DAILY
Qty: 90 TABLET | Refills: 3 | Status: SHIPPED | OUTPATIENT
Start: 2018-10-12 | End: 2020-03-05

## 2018-10-12 RX ORDER — LISINOPRIL 40 MG/1
40 TABLET ORAL DAILY
Qty: 90 TABLET | Refills: 3 | Status: SHIPPED | OUTPATIENT
Start: 2018-10-12 | End: 2019-10-02 | Stop reason: ALTCHOICE

## 2018-10-12 RX ORDER — HYDROCHLOROTHIAZIDE 25 MG/1
25 TABLET ORAL DAILY
Qty: 90 TABLET | Refills: 3 | Status: SHIPPED | OUTPATIENT
Start: 2018-10-12 | End: 2019-10-02 | Stop reason: ALTCHOICE

## 2018-10-12 NOTE — PROGRESS NOTES
"Subjective:       Patient ID: Claudio Herr is a 38 y.o. male.    Chief Complaint: Annual Exam    HPI  39 y/o man with HTN, HLD, DM2, vitamin D deficiency here for follow up and annual exam  Last seen 7/2017, rec'd return in 3 months at that time; did see PA 1 month later but delayed PCP follow up.  Ran out of all medications 2 months ago.    DM2 with nephropathy - diagnosed 7/2011 with A1c 11.0. Has followed with Dr Lorenzo with Endocrine on & off since that time. Referred to DM education/empowerment after last visit  Checks BG every other day - reports 80s-140s recently  Recent A1c 6.4, also +microalbuminuria  Previously on bydureon and metformin 1000mg BID. Not consistent with medications. Reports stopping metformin about a month after last visit (>1 year ago) due to GI effects (taking before meals), did not restart  Not sedentary at work - walks several hours/day at job, new in past 3 months  Last eye exam 8/2017 - no retinopathy  Due for foot exam    HTN - had been taking HCTZ and lisinopril, ran out 2 months ago  Not checking BP at home  No headache, vision changes, chest pain, or dyspnea.    HLD - prescribed statin, out of this    Vitamin D deficiency - previously recommended to take 5000IU daily    Review of Systems      Past medical history, surgical history, and family medical history reviewed and updated as appropriate.    Medications and allergies reviewed.     Objective:          Vitals:    10/12/18 0813 10/12/18 0858   BP: (!) 142/120 (!) 156/100   BP Location: Right arm    Patient Position: Sitting    BP Method: Large (Manual)    Pulse: 88    SpO2: 99%    Weight: 130.8 kg (288 lb 5.8 oz)    Height: 6' 2" (1.88 m)      Body mass index is 37.02 kg/m².  Physical Exam    Lab Results   Component Value Date    WBC 8.83 10/11/2018    HGB 13.5 (L) 10/11/2018    HCT 41.5 10/11/2018     10/11/2018    CHOL 204 (H) 10/11/2018    TRIG 122 10/11/2018    HDL 40 10/11/2018    ALT 24 10/11/2018    AST 22 " 10/11/2018     10/11/2018    K 3.8 10/11/2018     10/11/2018    CREATININE 1.0 10/11/2018    BUN 14 10/11/2018    CO2 25 10/11/2018    TSH 2.832 03/17/2014    HGBA1C 6.4 (H) 10/11/2018       Assessment:       1. Annual physical exam    2. Type 2 diabetes mellitus with microalbuminuria, without long-term current use of insulin    3. HTN (hypertension), benign    4. Mixed dyslipidemia    5. Vitamin D deficiency    6. Severe obesity with body mass index (BMI) of 35.0 to 39.9 with comorbidity        Plan:   Claudio was seen today for annual exam.    Diagnoses and all orders for this visit:    Annual physical exam - Overall stable; doing better with DM control with more daily exercise, but BP still quite high. Asymptomatic hypertension.   Restarting medication  Reviewed chronic and preventive health concerns.    Type 2 diabetes mellitus with microalbuminuria, without long-term current use of insulin  Comments:  A1c at goal. Recommended restart bydureon; can hold on restarting metformin given DM is at goal currently with increased exercise  Orders:  -     MICROALBUMIN / CREATININE RATIO URINE  -     exenatide microspheres (BYDUREON) 2 mg SSRR; Inject 2 mg into the skin once a week.  -     Basic metabolic panel; Future  -     Hemoglobin A1c; Future  -     Vitamin D; Future  -     Lipid panel; Future    HTN (hypertension), benign  Comments:  Restart lisinopril; add HCTZ in 1-2 days   Orders:  -     hydroCHLOROthiazide (HYDRODIURIL) 25 MG tablet; Take 1 tablet (25 mg total) by mouth once daily.  -     lisinopril (PRINIVIL,ZESTRIL) 40 MG tablet; Take 1 tablet (40 mg total) by mouth once daily.  -     NURSING COMMUNICATION: Create [email or web address] Account  -     Hypertension Digital Medicine (HDMP) Enrollment Order  -     Hypertension Digital Medicine (HDMP): Assign Onboarding Questionnaires    Mixed dyslipidemia  Comments:  restarting statin  Orders:  -     atorvastatin (LIPITOR) 40 MG tablet; Take 1 tablet (40 mg total)  by mouth once daily.  -     Lipid panel; Future    Vitamin D deficiency  -     ergocalciferol (ERGOCALCIFEROL) 50,000 unit Cap; Take 1 capsule (50,000 Units total) by mouth every 7 days. For vitamin D deficiency  -     Vitamin D; Future    Severe obesity with body mass index (BMI) of 35.0 to 39.9 with comorbidity    Health maintenance reviewed with patient.   Labs in 3 months  Encouraged to go to [facility] to check on Digital HTN program  F/u in 2-4 weeks with NP/PA for BP check    Follow-up in about 3 months (around 1/12/2019) for diabetes, hypertension.    Naeem Chamorro MD  Internal Medicine  [facility]  10/12/2018

## 2018-10-12 NOTE — MEDICAL/APP STUDENT
Subjective:       Patient ID: Claudio Herr is a 38 y.o. male.    Chief Complaint: Annual Exam  Mr. Herr is a 38 year old male who is here for an annual exam and follow up of his chronic medical conditions. He ran out of his medications 2 months ago and has not been taking any regular medication since then.    Diabetes Type 2  Had previously been taking Bydureon 2mg once a week IM and metformin 500mg. He stopped taking the metformin approximately 1 month after being prescribed as it did not make him feel well and he was getting nauseous. Normally would take metformin before meals. He ran out of the Bydureon 2 months ago and has not been taking it since then. He started a new job where he is constantly walking for a few hours a day and he has lost about 12 pounds in the last year. Says he will check his blood sugar at home approximately every other day, and generally they would be in the 80s-140s.   -A1c done yesterday was 6.4 (down from 10.7 1 year ago)   -Microalb Creat Ratio 131.8 (up from 113.1 2 years ago)  -Last opthal exam 1 year ago  -Last foot exam 1 year ago    Hypertension  Had been prescribed lisinopril 40 mg and hydrochlorothiazide 25 mg for high blood pressure. He has also not taken these medications in the past 2 months. He does not have a blood pressure cuff at home and does not monitor it on a daily basis.  Today his pressure was measured 142/120. He is not having any symptoms at this moment such as headaches, vision changes, chest pain, or dyspnea.     Hyperlipidemia  Taking atorvastatin 40mg, however ran out 2 months ago as well     Review of Systems   Constitutional: Negative for activity change, appetite change, fatigue, fever and unexpected weight change.   HENT: Negative for congestion.    Eyes: Negative for visual disturbance.   Respiratory: Negative for chest tightness and shortness of breath.    Cardiovascular: Negative for chest pain, palpitations and leg swelling.   Gastrointestinal:  Negative for constipation, diarrhea, nausea and vomiting.   Genitourinary: Negative for frequency.   Neurological: Negative for dizziness, light-headedness and headaches.       Objective:       Vitals:    10/12/18 0813   BP: (!) 142/120   Pulse: 88   Repeat /110     Physical Exam   Constitutional: He appears well-developed and well-nourished. No distress.   Eyes: Pupils are equal, round, and reactive to light.   Cardiovascular: Normal rate, regular rhythm and normal heart sounds.   Pulmonary/Chest: Effort normal and breath sounds normal. No stridor. No respiratory distress. He has no wheezes.   Feet:   Right Foot:   Protective Sensation: 5 sites tested. 5 sites sensed.   Skin Integrity: Positive for dry skin.   Fungal infection between toes. Nail changes   Left Foot:   Protective Sensation: 5 sites tested. 5 sites sensed.   Fungal infection between toes   Skin: He is not diaphoretic.       Assessment:       1. Vitamin D deficiency    2. Type 2 diabetes mellitus with microalbuminuria, without long-term current use of insulin    3. HTN (hypertension), benign    4. Uncontrolled type 2 diabetes mellitus with microalbuminuria, without long-term current use of insulin    5. Hypertension, essential    6. Mixed dyslipidemia        Plan:       Claudio was seen today for annual exam.    Type 2 diabetes mellitus with microalbuminuria, without long-term current use of insulin  -Previously high sugars and taking metformin and bydureon to control. Current A1c within range, likely new change in activity at work as contributed to helping decrease blood sugars given that he ran out of his medication a few months ago  -Will restart Bydureon 2mg IM once a week, and have him monitor sugars at home  -Gave information and referral to digital diabetes program for closer monitoring. Will consider talking to health  in the future   -Antifungal cream for foot infection   -Need to follow up with optometry for annual eye exam, and  potential referral to podiatry in the future     HTN (hypertension), benign  -Currently uncontrolled (142/120)  -Will restart lisinopril 40 mg daily and potentially hydrochlorothiazide once he adjusts back to the lisinopril   -Referred to digital hypertension program, and will work on getting him a cuff at home and monitoring pressure regularly   -Counseled about the importance of keeping blood pressure under control and the increased risk of stroke     Mixed dyslipidemia  -Restart atorvastatin 40 mg

## 2018-10-12 NOTE — PATIENT INSTRUCTIONS
For now, ok to stop taking metformin (or not restart) and just restart the bydureon once a week. If this is expensive or not covered by your insurance, please let Dr Chamorro know right away!    Restart your blood pressure medications - lisinopril and hydrochlorothiazide.    Restart your cholesterol medication - atorvastatin. This also helps protect you from heart attack and stroke.    Follow up in 2-4 weeks for recheck of your blood pressure once you are back on the medications.     Labs and follow up with Dr Chamorro in 3 months.

## 2018-10-15 VITALS
HEIGHT: 74 IN | HEART RATE: 88 BPM | WEIGHT: 288.38 LBS | DIASTOLIC BLOOD PRESSURE: 100 MMHG | OXYGEN SATURATION: 99 % | BODY MASS INDEX: 37.01 KG/M2 | SYSTOLIC BLOOD PRESSURE: 156 MMHG

## 2018-10-19 DIAGNOSIS — E11.9 TYPE 2 DIABETES MELLITUS WITHOUT COMPLICATION, UNSPECIFIED WHETHER LONG TERM INSULIN USE: ICD-10-CM

## 2019-08-20 ENCOUNTER — OCCUPATIONAL HEALTH (OUTPATIENT)
Dept: URGENT CARE | Facility: CLINIC | Age: 39
End: 2019-08-20

## 2019-08-20 DIAGNOSIS — Z02.1 PRE-EMPLOYMENT EXAMINATION: Primary | ICD-10-CM

## 2019-08-20 PROCEDURE — 86580 TB INTRADERMAL TEST: CPT | Mod: S$GLB,,, | Performed by: PREVENTIVE MEDICINE

## 2019-08-20 PROCEDURE — 86580 POCT TB SKIN TEST: ICD-10-PCS | Mod: S$GLB,,, | Performed by: PREVENTIVE MEDICINE

## 2019-08-23 ENCOUNTER — OCCUPATIONAL HEALTH (OUTPATIENT)
Dept: URGENT CARE | Facility: CLINIC | Age: 39
End: 2019-08-23

## 2019-08-23 DIAGNOSIS — Z11.1 PPD SCREENING TEST: Primary | ICD-10-CM

## 2019-08-23 LAB
TB INDURATION - 48 HR READ: ABNORMAL MM
TB INDURATION - 72 HR READ: 12 MM
TB SKIN TEST - 48 HR READ: ABNORMAL
TB SKIN TEST - 72 HR READ: POSITIVE

## 2019-08-23 PROCEDURE — 71045 X-RAY EXAM CHEST 1 VIEW: CPT | Mod: FY,S$GLB,, | Performed by: RADIOLOGY

## 2019-08-23 PROCEDURE — 71045 XR CHEST 1 VIEW: ICD-10-PCS | Mod: FY,S$GLB,, | Performed by: RADIOLOGY

## 2019-09-18 ENCOUNTER — HOSPITAL ENCOUNTER (OUTPATIENT)
Dept: RADIOLOGY | Facility: HOSPITAL | Age: 39
Discharge: HOME | End: 2019-09-18
Attending: PHYSICIAN ASSISTANT
Payer: COMMERCIAL

## 2019-09-18 ENCOUNTER — OFFICE VISIT (OUTPATIENT)
Dept: INTERNAL MEDICINE | Facility: CLINIC | Age: 39
End: 2019-09-18
Payer: COMMERCIAL

## 2019-09-18 VITALS
OXYGEN SATURATION: 99 % | WEIGHT: 299.63 LBS | BODY MASS INDEX: 38.47 KG/M2 | HEART RATE: 89 BPM | SYSTOLIC BLOOD PRESSURE: 180 MMHG | DIASTOLIC BLOOD PRESSURE: 108 MMHG

## 2019-09-18 DIAGNOSIS — E11.29 TYPE 2 DIABETES MELLITUS WITH MICROALBUMINURIA, WITHOUT LONG-TERM CURRENT USE OF INSULIN: ICD-10-CM

## 2019-09-18 DIAGNOSIS — L73.9 FOLLICULITIS: ICD-10-CM

## 2019-09-18 DIAGNOSIS — R59.0 CERVICAL LYMPHADENOPATHY: ICD-10-CM

## 2019-09-18 DIAGNOSIS — R80.9 TYPE 2 DIABETES MELLITUS WITH MICROALBUMINURIA, WITHOUT LONG-TERM CURRENT USE OF INSULIN: ICD-10-CM

## 2019-09-18 DIAGNOSIS — R76.11 POSITIVE PPD: ICD-10-CM

## 2019-09-18 DIAGNOSIS — E66.01 SEVERE OBESITY WITH BODY MASS INDEX (BMI) OF 35.0 TO 39.9 WITH COMORBIDITY: ICD-10-CM

## 2019-09-18 DIAGNOSIS — F17.200 SMOKER: ICD-10-CM

## 2019-09-18 DIAGNOSIS — I10 UNCONTROLLED HYPERTENSION: Primary | ICD-10-CM

## 2019-09-18 PROCEDURE — 99214 PR OFFICE/OUTPT VISIT, EST, LEVL IV, 30-39 MIN: ICD-10-PCS | Mod: S$GLB,,, | Performed by: PHYSICIAN ASSISTANT

## 2019-09-18 PROCEDURE — 99999 PR PBB SHADOW E&M-EST. PATIENT-LVL IV: ICD-10-PCS | Mod: PBBFAC,,, | Performed by: PHYSICIAN ASSISTANT

## 2019-09-18 PROCEDURE — 3077F PR MOST RECENT SYSTOLIC BLOOD PRESSURE >= 140 MM HG: ICD-10-PCS | Mod: CPTII,S$GLB,, | Performed by: PHYSICIAN ASSISTANT

## 2019-09-18 PROCEDURE — 99214 OFFICE O/P EST MOD 30 MIN: CPT | Mod: S$GLB,,, | Performed by: PHYSICIAN ASSISTANT

## 2019-09-18 PROCEDURE — 3008F BODY MASS INDEX DOCD: CPT | Mod: CPTII,S$GLB,, | Performed by: PHYSICIAN ASSISTANT

## 2019-09-18 PROCEDURE — 99999 PR PBB SHADOW E&M-EST. PATIENT-LVL IV: CPT | Mod: PBBFAC,,, | Performed by: PHYSICIAN ASSISTANT

## 2019-09-18 PROCEDURE — 3080F DIAST BP >= 90 MM HG: CPT | Mod: CPTII,S$GLB,, | Performed by: PHYSICIAN ASSISTANT

## 2019-09-18 PROCEDURE — 3008F PR BODY MASS INDEX (BMI) DOCUMENTED: ICD-10-PCS | Mod: CPTII,S$GLB,, | Performed by: PHYSICIAN ASSISTANT

## 2019-09-18 PROCEDURE — 3044F PR MOST RECENT HEMOGLOBIN A1C LEVEL <7.0%: ICD-10-PCS | Mod: CPTII,S$GLB,, | Performed by: PHYSICIAN ASSISTANT

## 2019-09-18 PROCEDURE — 3080F PR MOST RECENT DIASTOLIC BLOOD PRESSURE >= 90 MM HG: ICD-10-PCS | Mod: CPTII,S$GLB,, | Performed by: PHYSICIAN ASSISTANT

## 2019-09-18 PROCEDURE — 3077F SYST BP >= 140 MM HG: CPT | Mod: CPTII,S$GLB,, | Performed by: PHYSICIAN ASSISTANT

## 2019-09-18 PROCEDURE — 3044F HG A1C LEVEL LT 7.0%: CPT | Mod: CPTII,S$GLB,, | Performed by: PHYSICIAN ASSISTANT

## 2019-09-18 PROCEDURE — 76536 US EXAM OF HEAD AND NECK: CPT | Mod: TC

## 2019-09-18 PROCEDURE — 76536 US EXAM OF HEAD AND NECK: CPT | Mod: 26,,, | Performed by: INTERNAL MEDICINE

## 2019-09-18 PROCEDURE — 76536 US SOFT TISSUE HEAD NECK THYROID: ICD-10-PCS | Mod: 26,,, | Performed by: INTERNAL MEDICINE

## 2019-09-18 RX ORDER — HYDROCHLOROTHIAZIDE 25 MG/1
25 TABLET ORAL
Status: DISCONTINUED | OUTPATIENT
Start: 2019-09-18 | End: 2019-09-18

## 2019-09-18 RX ORDER — LISINOPRIL 10 MG/1
40 TABLET ORAL
Status: DISCONTINUED | OUTPATIENT
Start: 2019-09-18 | End: 2019-09-18

## 2019-09-18 RX ORDER — MUPIROCIN 20 MG/G
OINTMENT TOPICAL 3 TIMES DAILY
Qty: 30 G | Refills: 0 | Status: SHIPPED | OUTPATIENT
Start: 2019-09-18 | End: 2020-12-14

## 2019-09-18 RX ORDER — CLONIDINE HYDROCHLORIDE 0.1 MG/1
0.1 TABLET ORAL
Status: COMPLETED | OUTPATIENT
Start: 2019-09-18 | End: 2019-09-18

## 2019-09-18 RX ADMIN — CLONIDINE HYDROCHLORIDE 0.1 MG: 0.1 TABLET ORAL at 09:09

## 2019-09-18 NOTE — LETTER
September 18, 2019                 [facility]  Internal Medicine  [address]  [facility] [address]  Phone: [phone]  Fax: [phone]   September 18, 2019     Patient: Claudio Herr   YOB: 1980   Date of Visit: 9/18/2019       To Whom it May Concern:    Claudio Herr was seen in my clinic on 9/18/2019.    Please excuse him from any classes or work missed.    If you have any questions or concerns, please don't hesitate to call.    Sincerely,           Cassidy Santana PA-C

## 2019-09-18 NOTE — PROGRESS NOTES
Subjective:       Patient ID: Claudio Herr is a 39 y.o. male.    Chief Complaint: Cyst (2 on back of head since monday) and Headache    HPI     Established pt of Naeem Chamorro MD (new to me)    C/o cyst to scalp and tender lymph node to back of neck, noticed 2-3 days ago    HTN: BP significantly elevated today in clinic. He admits to not also taking his meds. Currently prescribed lisinopril 40mg and hctz 25mg, last took several days ago. He endorses occ headaches improved with OTC analgesics.    Pt also reports recent positive PPD test at [facility], He had CXR that was negative. He has been unsuccessful with attempts to follow up.     DM:  Lab Results   Component Value Date    HGBA1C 6.4 (H) 10/11/2018   Denies p/p/p. Not monitoring BG at home. He has been out of [facility]. Unable to tolerate Metformin.     Past Medical History:   Diagnosis Date    Diabetes mellitus type II     Hyperlipidemia     Hypertension     Vitamin D deficiency disease      Social History     Tobacco Use    Smoking status: Current Some Day Smoker     Packs/day: 0.10     Years: 8.00     Pack years: 0.80     Types: Cigarettes    Smokeless tobacco: Never Used    Tobacco comment: occasional smokes a cigarrette   Substance Use Topics    Alcohol use: Yes     Comment: occasional    Drug use: No     Review of patient's allergies indicates:   Allergen Reactions    No known drug allergies          BP Readings from Last 3 Encounters:   09/18/19 (!) 188/122   10/12/18 (!) 156/100   08/10/17 (!) 160/100       Review of Systems   Constitutional: Negative for chills, fever and unexpected weight change.   HENT: Negative for congestion and sore throat.    Respiratory: Negative for cough and shortness of breath.    Cardiovascular: Negative for chest pain and leg swelling.   Gastrointestinal: Negative for abdominal pain, nausea and vomiting.   Skin: Negative for rash.        cyst   Neurological: Positive for headaches (occ, not daily).  Negative for weakness.   Hematological: Positive for adenopathy.       Objective: BP (!) 188/122 Comment: manual recheck  Pulse 89   Wt 135.9 kg (299 lb 9.7 oz)   SpO2 99%   BMI 38.47 kg/m²         Physical Exam   Constitutional: He appears well-developed and well-nourished. No distress.   HENT:   Head: Normocephalic and atraumatic.       Mouth/Throat: Oropharynx is clear and moist.   approx 1cm papule with scabbing and mild redness to posterior scalp   Cardiovascular: Normal rate and regular rhythm. Exam reveals no friction rub.   No murmur heard.  Pulmonary/Chest: Effort normal and breath sounds normal. He has no wheezes. He has no rales.   Abdominal: Soft. Bowel sounds are normal. There is no tenderness.   Lymphadenopathy:     He has cervical adenopathy (mildly tender to right posterior cervical/neck).        Right cervical: Posterior cervical adenopathy present.   Neurological: He is alert.   Skin: Skin is warm and dry. Capillary refill takes less than 2 seconds. No rash noted.   Psychiatric: He has a normal mood and affect.   Vitals reviewed.      Assessment:       1. Uncontrolled hypertension    2. Positive PPD    3. Severe obesity with body mass index (BMI) of 35.0 to 39.9 with comorbidity    4. Type 2 diabetes mellitus with microalbuminuria, without long-term current use of insulin        Plan:           Claudio was seen today for cyst and headache.    Diagnoses and all orders for this visit:    Uncontrolled hypertension  Vitals:    09/18/19 0834 09/18/19 0901 09/18/19 0951   BP: (!) 200/130 (!) 188/122  Comment: manual recheck (!) 180/108  Comment: manual recheck   -     cloNIDine tablet 0.1 mg today in clinic (imporvement in BP as above)  -Stressed importance of medication compliance  -RTC in 2 weeks for BP check and follow up  -Discussed BP goals    Type 2 diabetes mellitus with microalbuminuria, without long-term current use of insulin  Advised to monitor BG regularly  Will obtain repeat A1c and other  lab on RTC  Lab Results   Component Value Date    HGBA1C 6.4 (H) 10/11/2018         Severe obesity with body mass index (BMI) of 35.0 to 39.9 with comorbidity  BMI reviewed  Lifestyle mods encouraged.     Positive PPD  -     Ambulatory Referral to Infectious Disease    Cervical lymphadenopathy  -     US Soft Tissue Head Neck Thyroid; Future    Folliculitis      - warm compresses  -     mupirocin (BACTROBAN) 2 % ointment; Apply topically 3 (three) times daily.    Smoker  Smoking cessation encouraged  Declines referral to program at this time.       TIMOTHY MccallumC

## 2019-09-20 ENCOUNTER — PATIENT OUTREACH (OUTPATIENT)
Dept: ADMINISTRATIVE | Facility: OTHER | Age: 39
End: 2019-09-20

## 2019-09-23 ENCOUNTER — OFFICE VISIT (OUTPATIENT)
Dept: INFECTIOUS DISEASES | Facility: CLINIC | Age: 39
End: 2019-09-23
Payer: COMMERCIAL

## 2019-09-23 ENCOUNTER — TELEPHONE (OUTPATIENT)
Dept: INFECTIOUS DISEASES | Facility: CLINIC | Age: 39
End: 2019-09-23

## 2019-09-23 ENCOUNTER — LAB VISIT (OUTPATIENT)
Dept: LAB | Facility: HOSPITAL | Age: 39
End: 2019-09-23
Attending: INTERNAL MEDICINE
Payer: COMMERCIAL

## 2019-09-23 VITALS
DIASTOLIC BLOOD PRESSURE: 121 MMHG | HEART RATE: 93 BPM | TEMPERATURE: 99 F | HEIGHT: 74 IN | WEIGHT: 298.31 LBS | BODY MASS INDEX: 38.28 KG/M2 | SYSTOLIC BLOOD PRESSURE: 196 MMHG

## 2019-09-23 DIAGNOSIS — E11.29 TYPE 2 DIABETES MELLITUS WITH MICROALBUMINURIA, WITHOUT LONG-TERM CURRENT USE OF INSULIN: ICD-10-CM

## 2019-09-23 DIAGNOSIS — E78.5 HYPERLIPIDEMIA, UNSPECIFIED HYPERLIPIDEMIA TYPE: ICD-10-CM

## 2019-09-23 DIAGNOSIS — R80.9 TYPE 2 DIABETES MELLITUS WITH MICROALBUMINURIA, WITHOUT LONG-TERM CURRENT USE OF INSULIN: ICD-10-CM

## 2019-09-23 DIAGNOSIS — R76.11 NEWLY CONVERTED POSITIVE PPD TEST: ICD-10-CM

## 2019-09-23 DIAGNOSIS — I10 HTN (HYPERTENSION), BENIGN: ICD-10-CM

## 2019-09-23 DIAGNOSIS — R76.11 NEWLY CONVERTED POSITIVE PPD TEST: Primary | ICD-10-CM

## 2019-09-23 LAB — HIV 1+2 AB+HIV1 P24 AG SERPL QL IA: NEGATIVE

## 2019-09-23 PROCEDURE — 3077F SYST BP >= 140 MM HG: CPT | Mod: CPTII,S$GLB,, | Performed by: INTERNAL MEDICINE

## 2019-09-23 PROCEDURE — 3077F PR MOST RECENT SYSTOLIC BLOOD PRESSURE >= 140 MM HG: ICD-10-PCS | Mod: CPTII,S$GLB,, | Performed by: INTERNAL MEDICINE

## 2019-09-23 PROCEDURE — 3044F HG A1C LEVEL LT 7.0%: CPT | Mod: CPTII,S$GLB,, | Performed by: INTERNAL MEDICINE

## 2019-09-23 PROCEDURE — 3008F PR BODY MASS INDEX (BMI) DOCUMENTED: ICD-10-PCS | Mod: CPTII,S$GLB,, | Performed by: INTERNAL MEDICINE

## 2019-09-23 PROCEDURE — 3008F BODY MASS INDEX DOCD: CPT | Mod: CPTII,S$GLB,, | Performed by: INTERNAL MEDICINE

## 2019-09-23 PROCEDURE — 3080F DIAST BP >= 90 MM HG: CPT | Mod: CPTII,S$GLB,, | Performed by: INTERNAL MEDICINE

## 2019-09-23 PROCEDURE — 86592 SYPHILIS TEST NON-TREP QUAL: CPT

## 2019-09-23 PROCEDURE — 99999 PR PBB SHADOW E&M-EST. PATIENT-LVL III: ICD-10-PCS | Mod: PBBFAC,,, | Performed by: INTERNAL MEDICINE

## 2019-09-23 PROCEDURE — 86703 HIV-1/HIV-2 1 RESULT ANTBDY: CPT

## 2019-09-23 PROCEDURE — 99204 PR OFFICE/OUTPT VISIT, NEW, LEVL IV, 45-59 MIN: ICD-10-PCS | Mod: S$GLB,,, | Performed by: INTERNAL MEDICINE

## 2019-09-23 PROCEDURE — 3080F PR MOST RECENT DIASTOLIC BLOOD PRESSURE >= 90 MM HG: ICD-10-PCS | Mod: CPTII,S$GLB,, | Performed by: INTERNAL MEDICINE

## 2019-09-23 PROCEDURE — 3044F PR MOST RECENT HEMOGLOBIN A1C LEVEL <7.0%: ICD-10-PCS | Mod: CPTII,S$GLB,, | Performed by: INTERNAL MEDICINE

## 2019-09-23 PROCEDURE — 36415 COLL VENOUS BLD VENIPUNCTURE: CPT

## 2019-09-23 PROCEDURE — 99999 PR PBB SHADOW E&M-EST. PATIENT-LVL III: CPT | Mod: PBBFAC,,, | Performed by: INTERNAL MEDICINE

## 2019-09-23 PROCEDURE — 99204 OFFICE O/P NEW MOD 45 MIN: CPT | Mod: S$GLB,,, | Performed by: INTERNAL MEDICINE

## 2019-09-23 NOTE — PROGRESS NOTES
Subjective:      Patient ID: Claudio Herr is a 39 y.o. male.    Chief Complaint:Positive PPD      History of Present Illness  39-year-old male with history of HTN, HLD, DM2 presents for positive PPD.    Patient works for [employer].  He requires TB screening yearly.  Last year, PPD was negative.  This year, PPD read at 12 mm.  Patient was sent to ID for further evaluation.      Patient denied any weight loss, fevers, chills, night sweats, SOB, CP, cough, n/v/d, abdominal pain, hematuria, dysuria.  Reports intermittently needs to clear his throat.    Patient denied any encounters with anyone with active TB.  He has lived in [place] his entire life.  He has never traveled outside of the country.  Never homeless.      Review of Systems   Constitution: Negative for chills, decreased appetite, fever, malaise/fatigue, night sweats, weight gain and weight loss.   HENT: Negative for congestion, ear pain, hearing loss, hoarse voice, sore throat and tinnitus.    Eyes: Negative for blurred vision, redness and visual disturbance.   Cardiovascular: Negative for chest pain, leg swelling and palpitations.   Respiratory: Positive for cough. Negative for hemoptysis, shortness of breath and sputum production.    Hematologic/Lymphatic: Negative for adenopathy. Does not bruise/bleed easily.   Skin: Positive for rash. Negative for dry skin, itching and suspicious lesions.   Musculoskeletal: Negative for back pain, joint pain, myalgias and neck pain.   Gastrointestinal: Negative for abdominal pain, constipation, diarrhea, heartburn, nausea and vomiting.   Genitourinary: Negative for dysuria, flank pain, frequency, hematuria, hesitancy and urgency.   Neurological: Positive for headaches. Negative for dizziness, numbness, paresthesias and weakness.   Psychiatric/Behavioral: Negative for depression and memory loss. The patient does not have insomnia and is not nervous/anxious.      Objective:   Physical Exam    Constitutional: He is oriented to person, place, and time. He appears well-developed and well-nourished. No distress.   HENT:   Head: Normocephalic and atraumatic.   Eyes: Conjunctivae and EOM are normal.   Neck: Normal range of motion. Neck supple.   Cardiovascular: Normal rate, regular rhythm and normal heart sounds. Exam reveals no friction rub.   No murmur heard.  Pulmonary/Chest: Effort normal. No respiratory distress. He has no wheezes. He has no rales.   Abdominal: Soft. Bowel sounds are normal. He exhibits no distension and no mass. There is no guarding.   Musculoskeletal: Normal range of motion. He exhibits no edema.   Neurological: He is alert and oriented to person, place, and time.   Skin: Skin is warm and dry. No rash noted. He is not diaphoretic. No erythema.   Psychiatric: He has a normal mood and affect. His behavior is normal.   Vitals reviewed.      CXR 8/23/2019  There is stable radiographic appearance of the cardiomediastinal shadow, both hilar regions and the lungs.  The lungs appear clear there is no hilar enlargement or radiographic indication of pulmonary edema.  The hemidiaphragms remain sharply outlined and the costophrenic angles appear acute on this single view.    Assessment:   39-year-old male with history of HTN, HLD, DM2 presents for positive PPD.      Last year PPD negative, follow-up PPD positive, but no clear risk factors.  Will send quant gold for confirmation for latent TB.    Plan:   - Send quant gold, HIV, RPR    Cassidy Ellington MD MPH  Infectious Diseases [facility]

## 2019-09-23 NOTE — TELEPHONE ENCOUNTER
----- Message from Cassidy Ellington MD sent at 9/23/2019  4:19 PM CDT -----  Please call him to let him know HIV is negative

## 2019-09-23 NOTE — LETTER
September 23, 2019      Cassidy Santana PA-C  [address]  [facility] [address]           [facility] - Infectious Diseases  [address]  [facility] [address]  Phone: [phone]  Fax: [phone]          Patient: Claudio Herr   MR Number: [id number]   YOB: 1980   Date of Visit: 9/23/2019       Dear Cassidy Santana:    Thank you for referring Claudio Herr to me for evaluation. Attached you will find relevant portions of my assessment and plan of care.    If you have questions, please do not hesitate to call me. I look forward to following Claudio Herr along with you.    Sincerely,    Cassidy Ellington MD    Enclosure  CC:  No Recipients    If you would like to receive this communication electronically, please contact [email or web address] or [phone] to request more information on [facility] access.    For providers and/or their staff who would like to refer a patient to [facility], please contact us through our one-stop-shop provider referral line, [facility], at [phone].    If you feel you have received this communication in error or would no longer like to receive these types of communications, please e-mail [email or web address]

## 2019-09-23 NOTE — LETTER
September 23, 2019    Claudio Herr  [address] [facility] [address]             [facility] - Infectious Diseases  [address]  [place] [address]  Phone: [phone]  Fax: [phone] To Whom It May Concern:    Please excuse Mr. Herr from work on 9/23/2019 as he had several medical appointments.    Let me know if you have any questions or concerns.    Sincerely,        Cassidy Ellington MD

## 2019-09-24 ENCOUNTER — TELEPHONE (OUTPATIENT)
Dept: INFECTIOUS DISEASES | Facility: CLINIC | Age: 39
End: 2019-09-24

## 2019-09-24 LAB — RPR SER QL: NORMAL

## 2019-09-24 NOTE — TELEPHONE ENCOUNTER
----- Message from Cassidy Ellington MD sent at 9/24/2019  4:16 PM CDT -----  Call him that syphilis is negative

## 2019-10-02 ENCOUNTER — OFFICE VISIT (OUTPATIENT)
Dept: INTERNAL MEDICINE | Facility: CLINIC | Age: 39
End: 2019-10-02
Payer: COMMERCIAL

## 2019-10-02 ENCOUNTER — LAB VISIT (OUTPATIENT)
Dept: LAB | Facility: HOSPITAL | Age: 39
End: 2019-10-02
Attending: INTERNAL MEDICINE
Payer: COMMERCIAL

## 2019-10-02 VITALS
DIASTOLIC BLOOD PRESSURE: 90 MMHG | SYSTOLIC BLOOD PRESSURE: 165 MMHG | BODY MASS INDEX: 37.45 KG/M2 | WEIGHT: 291.69 LBS | OXYGEN SATURATION: 98 % | HEART RATE: 86 BPM

## 2019-10-02 DIAGNOSIS — R80.9 TYPE 2 DIABETES MELLITUS WITH MICROALBUMINURIA, WITHOUT LONG-TERM CURRENT USE OF INSULIN: Primary | ICD-10-CM

## 2019-10-02 DIAGNOSIS — I10 HTN (HYPERTENSION), BENIGN: ICD-10-CM

## 2019-10-02 DIAGNOSIS — E11.29 TYPE 2 DIABETES MELLITUS WITH MICROALBUMINURIA, WITHOUT LONG-TERM CURRENT USE OF INSULIN: ICD-10-CM

## 2019-10-02 DIAGNOSIS — R80.9 TYPE 2 DIABETES MELLITUS WITH MICROALBUMINURIA, WITHOUT LONG-TERM CURRENT USE OF INSULIN: ICD-10-CM

## 2019-10-02 DIAGNOSIS — E11.29 TYPE 2 DIABETES MELLITUS WITH MICROALBUMINURIA, WITHOUT LONG-TERM CURRENT USE OF INSULIN: Primary | ICD-10-CM

## 2019-10-02 LAB
ANION GAP SERPL CALC-SCNC: 8 MMOL/L (ref 8–16)
BUN SERPL-MCNC: 17 MG/DL (ref 6–20)
CALCIUM SERPL-MCNC: 9.4 MG/DL (ref 8.7–10.5)
CHLORIDE SERPL-SCNC: 103 MMOL/L (ref 95–110)
CHOLEST SERPL-MCNC: 155 MG/DL (ref 120–199)
CHOLEST/HDLC SERPL: 4.3 {RATIO} (ref 2–5)
CO2 SERPL-SCNC: 28 MMOL/L (ref 23–29)
CREAT SERPL-MCNC: 1.1 MG/DL (ref 0.5–1.4)
EST. GFR  (AFRICAN AMERICAN): >60 ML/MIN/1.73 M^2
EST. GFR  (NON AFRICAN AMERICAN): >60 ML/MIN/1.73 M^2
ESTIMATED AVG GLUCOSE: 151 MG/DL (ref 68–131)
GLUCOSE SERPL-MCNC: 122 MG/DL (ref 70–110)
HBA1C MFR BLD HPLC: 6.9 % (ref 4–5.6)
HDLC SERPL-MCNC: 36 MG/DL (ref 40–75)
HDLC SERPL: 23.2 % (ref 20–50)
LDLC SERPL CALC-MCNC: 102.8 MG/DL (ref 63–159)
NONHDLC SERPL-MCNC: 119 MG/DL
POTASSIUM SERPL-SCNC: 3.9 MMOL/L (ref 3.5–5.1)
SODIUM SERPL-SCNC: 139 MMOL/L (ref 136–145)
TRIGL SERPL-MCNC: 81 MG/DL (ref 30–150)

## 2019-10-02 PROCEDURE — 80048 BASIC METABOLIC PNL TOTAL CA: CPT

## 2019-10-02 PROCEDURE — 99214 PR OFFICE/OUTPT VISIT, EST, LEVL IV, 30-39 MIN: ICD-10-PCS | Mod: S$GLB,,, | Performed by: PHYSICIAN ASSISTANT

## 2019-10-02 PROCEDURE — 99999 PR PBB SHADOW E&M-EST. PATIENT-LVL III: ICD-10-PCS | Mod: PBBFAC,,, | Performed by: PHYSICIAN ASSISTANT

## 2019-10-02 PROCEDURE — 36415 COLL VENOUS BLD VENIPUNCTURE: CPT

## 2019-10-02 PROCEDURE — 3044F HG A1C LEVEL LT 7.0%: CPT | Mod: CPTII,S$GLB,, | Performed by: PHYSICIAN ASSISTANT

## 2019-10-02 PROCEDURE — 3080F DIAST BP >= 90 MM HG: CPT | Mod: CPTII,S$GLB,, | Performed by: PHYSICIAN ASSISTANT

## 2019-10-02 PROCEDURE — 3077F SYST BP >= 140 MM HG: CPT | Mod: CPTII,S$GLB,, | Performed by: PHYSICIAN ASSISTANT

## 2019-10-02 PROCEDURE — 3008F PR BODY MASS INDEX (BMI) DOCUMENTED: ICD-10-PCS | Mod: CPTII,S$GLB,, | Performed by: PHYSICIAN ASSISTANT

## 2019-10-02 PROCEDURE — 80061 LIPID PANEL: CPT

## 2019-10-02 PROCEDURE — 3044F PR MOST RECENT HEMOGLOBIN A1C LEVEL <7.0%: ICD-10-PCS | Mod: CPTII,S$GLB,, | Performed by: PHYSICIAN ASSISTANT

## 2019-10-02 PROCEDURE — 3080F PR MOST RECENT DIASTOLIC BLOOD PRESSURE >= 90 MM HG: ICD-10-PCS | Mod: CPTII,S$GLB,, | Performed by: PHYSICIAN ASSISTANT

## 2019-10-02 PROCEDURE — 83036 HEMOGLOBIN GLYCOSYLATED A1C: CPT

## 2019-10-02 PROCEDURE — 99999 PR PBB SHADOW E&M-EST. PATIENT-LVL III: CPT | Mod: PBBFAC,,, | Performed by: PHYSICIAN ASSISTANT

## 2019-10-02 PROCEDURE — 99214 OFFICE O/P EST MOD 30 MIN: CPT | Mod: S$GLB,,, | Performed by: PHYSICIAN ASSISTANT

## 2019-10-02 PROCEDURE — 3077F PR MOST RECENT SYSTOLIC BLOOD PRESSURE >= 140 MM HG: ICD-10-PCS | Mod: CPTII,S$GLB,, | Performed by: PHYSICIAN ASSISTANT

## 2019-10-02 PROCEDURE — 3008F BODY MASS INDEX DOCD: CPT | Mod: CPTII,S$GLB,, | Performed by: PHYSICIAN ASSISTANT

## 2019-10-02 RX ORDER — LOSARTAN POTASSIUM AND HYDROCHLOROTHIAZIDE 25; 100 MG/1; MG/1
1 TABLET ORAL DAILY
Qty: 90 TABLET | Refills: 3 | Status: SHIPPED | OUTPATIENT
Start: 2019-10-02 | End: 2020-04-07

## 2019-10-02 RX ORDER — HYDROCHLOROTHIAZIDE 25 MG/1
25 TABLET ORAL DAILY
Qty: 90 TABLET | Refills: 3 | Status: CANCELLED | OUTPATIENT
Start: 2019-10-02 | End: 2019-11-01

## 2019-10-02 NOTE — PROGRESS NOTES
"Subjective:       Patient ID: Claudio Herr is a 39 y.o. male.    Chief Complaint: Follow-up (bp check)    HPI     Established pt of Naeem Chamorro MD (new to me)    Pt here today for BP follow up.   Prior meds restarted at last visit  Today BP improved 165/90.  He reports headaches have resolved  He is tolerating meds well.   No acute complaints today.   He has cut back on cig, less than 0.5ppd    DM:        Lab Results   Component Value Date     HGBA1C 6.4 (H) 10/11/2018   Denies p/p/p. Not monitoring BG at home. He has been out of [facility], plans to check with pharmacy today. Unable to tolerate Metformin.       He reports the "cyst" at the back of his neck has completely resolved.     Past Medical History:   Diagnosis Date    Diabetes mellitus type II     Hyperlipidemia     Hypertension     Vitamin D deficiency disease      Social History     Tobacco Use    Smoking status: Current Some Day Smoker     Packs/day: 0.10     Years: 8.00     Pack years: 0.80     Types: Cigarettes    Smokeless tobacco: Never Used    Tobacco comment: occasional smokes a cigarrette   Substance Use Topics    Alcohol use: Yes     Comment: occasional    Drug use: No     Review of patient's allergies indicates:   Allergen Reactions    No known drug allergies          Review of Systems   Constitutional: Negative for chills, fever and unexpected weight change.   Respiratory: Negative for cough and shortness of breath.    Cardiovascular: Negative for chest pain and leg swelling.   Gastrointestinal: Negative for abdominal pain, nausea and vomiting.   Skin: Negative for rash.   Neurological: Negative for dizziness, weakness, light-headedness and headaches.       Objective: BP (!) 165/90   Pulse 86   Wt 132.3 kg (291 lb 10.7 oz)   SpO2 98%   BMI 37.45 kg/m²         Physical Exam   Constitutional: He appears well-developed and well-nourished. No distress.   HENT:   Head: Normocephalic and atraumatic.   Mouth/Throat: Oropharynx is " clear and moist.   Cardiovascular: Normal rate and regular rhythm. Exam reveals no friction rub.   No murmur heard.  Pulmonary/Chest: Effort normal and breath sounds normal. He has no wheezes. He has no rales.   Musculoskeletal: He exhibits no edema.   Neurological: He is alert.   Skin: Skin is warm and dry. No rash noted.   Vitals reviewed.      Assessment:       1. Type 2 diabetes mellitus with microalbuminuria, without long-term current use of insulin    2. HTN (hypertension), benign        Plan:         Claudio was seen today for follow-up.    Diagnoses and all orders for this visit:    Type 2 diabetes mellitus with microalbuminuria, without long-term current use of insulin  -     Hemoglobin A1c; Future  -     Lipid panel; Future  -     losartan-hydrochlorothiazide 100-25 mg (HYZAAR) 100-25 mg per tablet; Take 1 tablet by mouth once daily.    HTN (hypertension), benign  Comments:  Improved  Continue HCTZ 25  Will changed lisinopril to losartan  Start Digital HTN program for continue management  F/u with PCP next available  Orders:  -     NURSING COMMUNICATION: [facility] Account  -     Hypertension Digital Medicine (HDMP) Enrollment Order  -     Hypertension Digital Medicine (HDMP): Assign Onboarding Questionnaires  -     Basic metabolic panel; Future      Cassidy Santana PA-C

## 2019-10-03 ENCOUNTER — TELEPHONE (OUTPATIENT)
Dept: INTERNAL MEDICINE | Facility: CLINIC | Age: 39
End: 2019-10-03

## 2019-10-03 NOTE — TELEPHONE ENCOUNTER
----- Message from Alicja Orta sent at 10/3/2019  4:32 PM CDT -----  Contact: [phone]  Type: Returning a call    Who left a message?  Lexi    When did the practice call? today    Comments:  Lab results  Please advise, thank you

## 2019-10-03 NOTE — TELEPHONE ENCOUNTER
----- Message from Cassidy Santana PA-C sent at 10/3/2019 12:59 PM CDT -----  Please inform patient his cholesterol level has improved, continue the Lipitor. His diabetes is well controlled, A1 at goal at 6.9%

## 2019-10-15 DIAGNOSIS — E55.9 VITAMIN D DEFICIENCY: ICD-10-CM

## 2019-10-17 RX ORDER — ERGOCALCIFEROL 1.25 MG/1
CAPSULE ORAL
Qty: 4 CAPSULE | Refills: 5 | Status: SHIPPED | OUTPATIENT
Start: 2019-10-17 | End: 2020-09-04

## 2020-01-28 ENCOUNTER — OFFICE VISIT (OUTPATIENT)
Dept: URGENT CARE | Facility: CLINIC | Age: 40
End: 2020-01-28
Payer: COMMERCIAL

## 2020-01-28 VITALS
WEIGHT: 293 LBS | BODY MASS INDEX: 38.83 KG/M2 | RESPIRATION RATE: 16 BRPM | DIASTOLIC BLOOD PRESSURE: 93 MMHG | HEIGHT: 73 IN | TEMPERATURE: 98 F | HEART RATE: 76 BPM | OXYGEN SATURATION: 95 % | SYSTOLIC BLOOD PRESSURE: 149 MMHG
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
## 2020-01-28 DIAGNOSIS — M54.50 ACUTE LOW BACK PAIN WITHOUT SCIATICA, UNSPECIFIED BACK PAIN LATERALITY: ICD-10-CM

## 2020-01-28 DIAGNOSIS — S39.012A STRAIN OF LUMBAR REGION, INITIAL ENCOUNTER: ICD-10-CM

## 2020-01-28 PROCEDURE — 72100 X-RAY EXAM L-S SPINE 2/3 VWS: CPT | Mod: FY,S$GLB,, | Performed by: RADIOLOGY

## 2020-01-28 PROCEDURE — 72100 XR LUMBAR SPINE 2 OR 3 VIEWS: ICD-10-PCS | Mod: FY,S$GLB,, | Performed by: RADIOLOGY

## 2020-01-28 PROCEDURE — 99204 PR OFFICE/OUTPT VISIT, NEW, LEVL IV, 45-59 MIN: ICD-10-PCS | Mod: S$GLB,,, | Performed by: NURSE PRACTITIONER

## 2020-01-28 PROCEDURE — 99204 OFFICE O/P NEW MOD 45 MIN: CPT | Mod: S$GLB,,, | Performed by: NURSE PRACTITIONER

## 2020-01-28 RX ORDER — IBUPROFEN 400 MG/1
200 TABLET ORAL 3 TIMES DAILY PRN
COMMUNITY
Start: 2020-01-28 | End: 2020-12-14

## 2020-01-28 RX ORDER — HYDROCHLOROTHIAZIDE 25 MG/1
25 TABLET ORAL DAILY
COMMUNITY
Start: 2020-01-11 | End: 2020-10-27

## 2020-01-28 RX ORDER — LOSARTAN POTASSIUM 100 MG/1
100 TABLET ORAL DAILY
COMMUNITY
Start: 2020-01-11 | End: 2020-10-27
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
## 2020-03-03 DIAGNOSIS — E78.2 MIXED DYSLIPIDEMIA: ICD-10-CM
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
## 2020-03-06 RX ORDER — ATORVASTATIN CALCIUM 40 MG/1
TABLET, FILM COATED ORAL
Qty: 90 TABLET | Refills: 0 | Status: SHIPPED | OUTPATIENT
Start: 2020-03-06 | End: 2020-12-14 | Stop reason: SDUPTHER

## 2020-03-06 NOTE — TELEPHONE ENCOUNTER
Please see the following assessment. This refill request still requires some action on your part. Pt's last OV with you was in 10/18. Last seen by ANY Santana 10/19.  Pt is due for appointment with you. Outside of [facility] protocol to refill if last OV with PCP > 15 months ago. Defer to you. Will follow up with your staff to schedule appointment after your decision.   Thank you.

## 2020-03-10 NOTE — TELEPHONE ENCOUNTER
Call patient no answer message left on patient voicemail to with pcw number to call and schedule appt

## 2020-04-07 ENCOUNTER — TELEPHONE (OUTPATIENT)
Dept: INTERNAL MEDICINE | Facility: CLINIC | Age: 40
End: 2020-04-07

## 2020-04-07 DIAGNOSIS — Z00.00 ANNUAL PHYSICAL EXAM: Primary | ICD-10-CM

## 2020-04-07 DIAGNOSIS — E11.29 TYPE 2 DIABETES MELLITUS WITH MICROALBUMINURIA, WITHOUT LONG-TERM CURRENT USE OF INSULIN: ICD-10-CM

## 2020-04-07 DIAGNOSIS — E78.2 MIXED DYSLIPIDEMIA: ICD-10-CM

## 2020-04-07 DIAGNOSIS — E66.01 SEVERE OBESITY WITH BODY MASS INDEX (BMI) OF 35.0 TO 39.9 WITH COMORBIDITY: ICD-10-CM

## 2020-04-07 DIAGNOSIS — E55.9 VITAMIN D DEFICIENCY: ICD-10-CM

## 2020-04-07 DIAGNOSIS — R80.9 TYPE 2 DIABETES MELLITUS WITH MICROALBUMINURIA, WITHOUT LONG-TERM CURRENT USE OF INSULIN: ICD-10-CM

## 2020-04-07 DIAGNOSIS — I10 HTN (HYPERTENSION), BENIGN: ICD-10-CM

## 2020-04-07 NOTE — TELEPHONE ENCOUNTER
----- Message from Dipika Elkins sent at 4/7/2020  9:48 AM CDT -----  Contact: Pts wife Mrs. Delarosa Mobile [phone] pts mobile # [phone]   Patient's wife Mrs. Delarosa is calling in regards to her wanting for you to write a leave of absence note to her husbands job [employer] due to her  having illnesses and some people at his job tested positive for the Covid -19 Virus. She would like for him to be out of work for a couple of weeks, she did not give me any exact dates.     Comment: Mrs. Delarosa would like for you to let her know when she can come in to  the note.

## 2020-04-07 NOTE — TELEPHONE ENCOUNTER
Called patient, spoke to him and his wife. His wife is worried about him as he did have close exposure to someone at work who has had COVID19 and passed away recently. He is currently out on self-quarantine related to this exposure, though not clear how long this planned for.    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   Patient is entirely asymptomatic, as is his family.  She is requesting a note for his work, but is not clear on what they are requesting this note for -- discussed that this is not a short-term situation, and while I do strongly recommend that patients work from home if at all possible, he would need to talk with his employer about whether this is an option. They do not think this is possible.  Offered to write letter for his work outlining CDC guidelines re: physical distancing and reduction of infection risk in workplaces as well as current state mandates / guidelines around this. He declines this for now.   If he does have an exposure, would again recommend self-quarantine for 14 days for close monitoring - he does not need this at present.  If he does develop any symptoms, would also recommend self-quarantine - he does not need this at present.  Recommended review [employer] coronavirus info website. Gave [facility] / hotline number, strongly recommended they contact clinic immediately if developing any symptoms. Reviewed symptoms to watch for.    He is due for follow up primary care visit - offered to schedule for telemed visit, he declines for now  Agreeable with scheduling in-person visit in ~3 months.  Labs ordered to be done prior to that visit.

## 2020-08-04 ENCOUNTER — PATIENT OUTREACH (OUTPATIENT)
Dept: ADMINISTRATIVE | Facility: HOSPITAL | Age: 40
End: 2020-08-04

## 2020-09-04 DIAGNOSIS — E55.9 VITAMIN D DEFICIENCY: ICD-10-CM

## 2020-09-04 RX ORDER — ERGOCALCIFEROL 1.25 MG/1
CAPSULE ORAL
Qty: 4 CAPSULE | Refills: 5 | Status: SHIPPED | OUTPATIENT
Start: 2020-09-04 | End: 2021-02-03
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
## 2020-09-04 NOTE — TELEPHONE ENCOUNTER
Care Due:                  Date            Visit Type   Department     Provider  --------------------------------------------------------------------------------                                PHYSICAL -                              ESTABLISHED   [facility]  Last Visit: 10-      PATIENT      MEDICINE       WENDY NOLAN  Next Visit: None Scheduled  None         None Found                                                            Last  Test          Frequency    Reason                     Performed    Due Date  --------------------------------------------------------------------------------    Office Visit  12 months..  atorvastatin, exenatide..  10-   10-    ALT.........  12 months..  atorvastatin.............  10-   10-    AST.........  12 months..  atorvastatin.............  10-   10-    Cr..........  12 months..  exenatide................  10-   09-    HBA1C.......  6 months...  exenatide................  10-   03-    HDL.........  12 months..  atorvastatin.............  10-   09-    LDL.........  12 months..  atorvastatin.............  10-   09-    Total         12 months..  atorvastatin.............  10-   09-  Cholesterol.    Triglyceride  12 months..  atorvastatin.............  10-   09-  s...........    Powered by [employer]. Reference number: [id number]. 9/04/2020 8:17:11 AM CDT

## 2020-09-05 NOTE — TELEPHONE ENCOUNTER
Patient overdue for follow-up with labs prior. No-show to recent visit scheduled in August.   Please contact to schedule.

## 2020-10-05 ENCOUNTER — OCCUPATIONAL HEALTH (OUTPATIENT)
Dept: URGENT CARE | Facility: CLINIC | Age: 40
End: 2020-10-05

## 2020-10-05 DIAGNOSIS — Z11.1 PPD SCREENING TEST: Primary | ICD-10-CM

## 2020-10-05 PROCEDURE — 86580 TB INTRADERMAL TEST: CPT | Mod: S$GLB,,, | Performed by: PREVENTIVE MEDICINE

## 2020-10-05 PROCEDURE — 86580 POCT TB SKIN TEST: ICD-10-PCS | Mod: S$GLB,,, | Performed by: PREVENTIVE MEDICINE

## 2020-11-10 ENCOUNTER — OCCUPATIONAL HEALTH (OUTPATIENT)
Dept: URGENT CARE | Facility: CLINIC | Age: 40
End: 2020-11-10

## 2020-11-10 DIAGNOSIS — Z02.89 ENCOUNTER FOR PHYSICAL EXAMINATION RELATED TO EMPLOYMENT: Primary | ICD-10-CM

## 2020-11-10 PROCEDURE — 86580 POCT TB SKIN TEST: ICD-10-PCS | Mod: S$GLB,,, | Performed by: EMERGENCY MEDICINE

## 2020-11-10 PROCEDURE — 86580 TB INTRADERMAL TEST: CPT | Mod: S$GLB,,, | Performed by: EMERGENCY MEDICINE

## 2020-11-12 ENCOUNTER — TELEPHONE (OUTPATIENT)
Dept: INTERNAL MEDICINE | Facility: CLINIC | Age: 40
End: 2020-11-12

## 2020-11-12 ENCOUNTER — OCCUPATIONAL HEALTH (OUTPATIENT)
Dept: URGENT CARE | Facility: CLINIC | Age: 40
End: 2020-11-12

## 2020-11-12 DIAGNOSIS — R76.11 POSITIVE PPD: Primary | ICD-10-CM

## 2020-11-12 DIAGNOSIS — Z11.1 PPD SCREENING TEST: ICD-10-CM

## 2020-11-12 LAB
TB INDURATION - 48 HR READ: 24 MM
TB INDURATION - 72 HR READ: ABNORMAL
TB SKIN TEST - 48 HR READ: POSITIVE
TB SKIN TEST - 72 HR READ: ABNORMAL

## 2020-11-12 PROCEDURE — 71045 X-RAY EXAM CHEST 1 VIEW: CPT | Mod: FY,S$GLB,, | Performed by: RADIOLOGY

## 2020-11-12 PROCEDURE — 71045 XR CHEST 1 VIEW: ICD-10-PCS | Mod: FY,S$GLB,, | Performed by: RADIOLOGY

## 2020-11-12 NOTE — TELEPHONE ENCOUNTER
----- Message from Laly Sanz sent at 11/12/2020  2:49 PM CST -----  Contact: Isaura(wife) [phone]  Pt wife states the pt had a TB test done and the results came back positive. Pt wife states he had positive results last year. Please call and advise the pt at [phone]

## 2020-11-16 ENCOUNTER — LAB VISIT (OUTPATIENT)
Dept: LAB | Facility: HOSPITAL | Age: 40
End: 2020-11-16
Payer: COMMERCIAL

## 2020-11-16 ENCOUNTER — OFFICE VISIT (OUTPATIENT)
Dept: INTERNAL MEDICINE | Facility: CLINIC | Age: 40
End: 2020-11-16
Payer: COMMERCIAL

## 2020-11-16 VITALS
OXYGEN SATURATION: 96 % | BODY MASS INDEX: 36.59 KG/M2 | SYSTOLIC BLOOD PRESSURE: 182 MMHG | HEART RATE: 107 BPM | HEIGHT: 75 IN | DIASTOLIC BLOOD PRESSURE: 102 MMHG | WEIGHT: 294.31 LBS

## 2020-11-16 DIAGNOSIS — R76.11 POSITIVE TB TEST: ICD-10-CM

## 2020-11-16 DIAGNOSIS — E66.01 SEVERE OBESITY WITH BODY MASS INDEX (BMI) OF 35.0 TO 39.9 WITH COMORBIDITY: ICD-10-CM

## 2020-11-16 DIAGNOSIS — I10 HTN (HYPERTENSION), BENIGN: ICD-10-CM

## 2020-11-16 DIAGNOSIS — R76.11 POSITIVE PPD: Primary | ICD-10-CM

## 2020-11-16 DIAGNOSIS — R80.9 TYPE 2 DIABETES MELLITUS WITH MICROALBUMINURIA, WITHOUT LONG-TERM CURRENT USE OF INSULIN: ICD-10-CM

## 2020-11-16 DIAGNOSIS — Z72.0 TOBACCO USE: ICD-10-CM

## 2020-11-16 DIAGNOSIS — E11.29 TYPE 2 DIABETES MELLITUS WITH MICROALBUMINURIA, WITHOUT LONG-TERM CURRENT USE OF INSULIN: ICD-10-CM

## 2020-11-16 PROCEDURE — 3080F PR MOST RECENT DIASTOLIC BLOOD PRESSURE >= 90 MM HG: ICD-10-PCS | Mod: CPTII,S$GLB,, | Performed by: PHYSICIAN ASSISTANT

## 2020-11-16 PROCEDURE — 99999 PR PBB SHADOW E&M-EST. PATIENT-LVL V: CPT | Mod: PBBFAC,,, | Performed by: PHYSICIAN ASSISTANT

## 2020-11-16 PROCEDURE — 1126F PR PAIN SEVERITY QUANTIFIED, NO PAIN PRESENT: ICD-10-PCS | Mod: S$GLB,,, | Performed by: PHYSICIAN ASSISTANT

## 2020-11-16 PROCEDURE — 99214 PR OFFICE/OUTPT VISIT, EST, LEVL IV, 30-39 MIN: ICD-10-PCS | Mod: S$GLB,,, | Performed by: PHYSICIAN ASSISTANT

## 2020-11-16 PROCEDURE — 3077F SYST BP >= 140 MM HG: CPT | Mod: CPTII,S$GLB,, | Performed by: PHYSICIAN ASSISTANT

## 2020-11-16 PROCEDURE — 86480 TB TEST CELL IMMUN MEASURE: CPT

## 2020-11-16 PROCEDURE — 1126F AMNT PAIN NOTED NONE PRSNT: CPT | Mod: S$GLB,,, | Performed by: PHYSICIAN ASSISTANT

## 2020-11-16 PROCEDURE — 99214 OFFICE O/P EST MOD 30 MIN: CPT | Mod: S$GLB,,, | Performed by: PHYSICIAN ASSISTANT

## 2020-11-16 PROCEDURE — 3008F PR BODY MASS INDEX (BMI) DOCUMENTED: ICD-10-PCS | Mod: CPTII,S$GLB,, | Performed by: PHYSICIAN ASSISTANT

## 2020-11-16 PROCEDURE — 99999 PR PBB SHADOW E&M-EST. PATIENT-LVL V: ICD-10-PCS | Mod: PBBFAC,,, | Performed by: PHYSICIAN ASSISTANT

## 2020-11-16 PROCEDURE — 3077F PR MOST RECENT SYSTOLIC BLOOD PRESSURE >= 140 MM HG: ICD-10-PCS | Mod: CPTII,S$GLB,, | Performed by: PHYSICIAN ASSISTANT

## 2020-11-16 PROCEDURE — 3008F BODY MASS INDEX DOCD: CPT | Mod: CPTII,S$GLB,, | Performed by: PHYSICIAN ASSISTANT

## 2020-11-16 PROCEDURE — 3080F DIAST BP >= 90 MM HG: CPT | Mod: CPTII,S$GLB,, | Performed by: PHYSICIAN ASSISTANT

## 2020-11-16 RX ORDER — AMLODIPINE BESYLATE 5 MG/1
5 TABLET ORAL DAILY
Qty: 30 TABLET | Refills: 3 | Status: SHIPPED | OUTPATIENT
Start: 2020-11-16 | End: 2021-03-17 | Stop reason: SDUPTHER

## 2020-11-16 NOTE — PROGRESS NOTES
Subjective:       Patient ID: Claudio Herr is a 40 y.o. male.    Chief Complaint: Follow-up    HPI     Established pt of Naeem Chamorro MD       Here to follow up positive TB skin test obtained at [facility] last week. Follow up CXR was negative. This is the second time patient had a positive skin test. He was seen by [facility] last year for positive PPD, confirmation lab was negative. Treatment was not recommended. He denies cp, sob, cough, hemoptysis, night sweats or unexplained weight loss. He works for the same company as last year, [employer].  No recent travel. No known TB exposure.    BP is elevated. He reports adherence with losartan 100mg and hctz 25mg. Took meds 2hrs prior to clinic arrival. No cp, ha, vision changes, or edema. Smokes cigs about half pack daily, weight stable but reports eating more fast food since the pandemic.     DM:  Last A1c 6.9% (October 2019). No p/p/p. Off Bydureon, diet controlled    Past Medical History:   Diagnosis Date    Diabetes mellitus type II     Hyperlipidemia     Hypertension     Vitamin D deficiency disease      Social History     Tobacco Use    Smoking status: Current Every Day Smoker     Packs/day: 1.00     Years: 8.00     Pack years: 8.00     Types: Cigarettes    Smokeless tobacco: Never Used   Substance Use Topics    Alcohol use: Yes     Comment: occasional    Drug use: No     Review of patient's allergies indicates:   Allergen Reactions    No known drug allergies          Review of Systems   Constitutional: Negative for chills, fever and unexpected weight change.   Eyes: Negative for visual disturbance.   Respiratory: Negative for cough, shortness of breath and wheezing.    Cardiovascular: Negative for chest pain and leg swelling.   Gastrointestinal: Negative for abdominal pain, nausea and vomiting.   Integumentary:  Negative for rash.   Neurological: Negative for weakness and headaches.         Objective: BP  "(!) 182/102 Comment: manual recheck  Pulse 107   Ht 6' 3" (1.905 m)   Wt 133.5 kg (294 lb 5 oz)   SpO2 96%   BMI 36.79 kg/m²         Physical Exam  Vitals signs reviewed.   Constitutional:       General: He is not in acute distress.     Appearance: He is well-developed.   HENT:      Head: Normocephalic and atraumatic.   Cardiovascular:      Rate and Rhythm: Normal rate and regular rhythm.      Heart sounds: No murmur. No friction rub.   Pulmonary:      Effort: Pulmonary effort is normal.      Breath sounds: Normal breath sounds. No wheezing or rales.   Abdominal:      General: Bowel sounds are normal.      Palpations: Abdomen is soft.      Tenderness: There is no abdominal tenderness.   Musculoskeletal: Normal range of motion.   Skin:     General: Skin is warm and dry.      Findings: No rash.   Neurological:      Mental Status: He is alert.         Assessment:       1. Positive TB test    2. HTN (hypertension), benign    3. Type 2 diabetes mellitus with microalbuminuria, without long-term current use of insulin        Plan:           Claudio was seen today for follow-up.    Diagnoses and all orders for this visit:    Positive PPD  -     Quantiferon Gold TB; Future    HTN (hypertension), benign  Uncontrolled  Continue losartan 100mg and hctz 25mg  Add amlodipine 5mg  BP goals discussed.   Lifestyle mods encouraged  -     amLODIPine (NORVASC) 5 MG tablet; Take 1 tablet (5 mg total) by mouth once daily.    Type 2 diabetes mellitus with microalbuminuria, without long-term current use of insulin  -     Hemoglobin A1C; Future    Tobacco use  Cessation encouraged  Pt declined program    Obesity  BMI reviewed  Lifestyle mods discussed    Cassidy Santana PA-C      "

## 2020-11-17 ENCOUNTER — OFFICE VISIT (OUTPATIENT)
Dept: URGENT CARE | Facility: CLINIC | Age: 40
End: 2020-11-17
Payer: COMMERCIAL

## 2020-11-17 VITALS
DIASTOLIC BLOOD PRESSURE: 100 MMHG | HEIGHT: 74 IN | RESPIRATION RATE: 18 BRPM | HEART RATE: 93 BPM | BODY MASS INDEX: 37.73 KG/M2 | SYSTOLIC BLOOD PRESSURE: 160 MMHG | WEIGHT: 294 LBS | TEMPERATURE: 98 F

## 2020-11-17 DIAGNOSIS — M54.2 CERVICAL PAIN (NECK): ICD-10-CM
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
## 2020-11-17 DIAGNOSIS — M62.838 MUSCLE SPASMS OF NECK: ICD-10-CM
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
## 2020-11-17 PROCEDURE — 99214 PR OFFICE/OUTPT VISIT, EST, LEVL IV, 30-39 MIN: ICD-10-PCS | Mod: S$GLB,,, | Performed by: NURSE PRACTITIONER

## 2020-11-17 PROCEDURE — 72040 X-RAY EXAM NECK SPINE 2-3 VW: CPT | Mod: FY,S$GLB,, | Performed by: RADIOLOGY

## 2020-11-17 PROCEDURE — 99214 OFFICE O/P EST MOD 30 MIN: CPT | Mod: S$GLB,,, | Performed by: NURSE PRACTITIONER

## 2020-11-17 PROCEDURE — 72040 XR CERVICAL SPINE 2 OR 3 VIEWS: ICD-10-PCS | Mod: FY,S$GLB,, | Performed by: RADIOLOGY

## 2020-11-17 RX ORDER — TIZANIDINE 4 MG/1
4 TABLET ORAL NIGHTLY PRN
Qty: 30 TABLET | Refills: 0 | Status: SHIPPED | OUTPATIENT
Start: 2020-11-17 | End: 2020-11-27

## 2020-11-17 NOTE — PATIENT INSTRUCTIONS
Shoulder Clock Exercise    To start, stand tall with your ears, shoulders, and hips in line. Your feet should be slightly apart, positioned just under your hips. Focus your eyes directly in front of you.  this position for a few seconds before starting your exercise. This helps increase your awareness of proper posture.  · Imagine that your right shoulder is the center of a clock. With the outer point of your shoulder, roll it around to slowly trace the outer edge of the clock.  · Move clockwise first, then counterclockwise.  · Repeat 3 to 5 times. Switch shoulders.   Date Last Reviewed: 10/2/2015  © 9957-3490 [employer]. [address] [place] [address]. All rights reserved. This information is not intended as a substitute for professional medical care. Always follow your healthcare professional's instructions.        Shoulder Girdle Stretch    To start, sit in a chair with your feet flat on the floor. Your weight should be slightly forward so that youre balanced evenly on your buttocks. Relax your shoulders and keep your head level. Using a chair with arms may help you keep your balance:  · Place 1 hand on the outside elbow of the other arm.  · Pull the arm across your body. Hold for 30 to 60 seconds. Repeat once.  · Switch sides.  For your safety, check with your healthcare provider before starting an exercise program.   Date Last Reviewed: 8/16/2015  © 0856-8302 [employer]. [address] [place] [address]. All rights reserved. This information is not intended as a substitute for professional medical care. Always follow your healthcare professional's instructions.        Shoulder Exercises    To start, sit in a chair with your feet flat on the floor. Your weight should be slightly forward so that youre balanced evenly on your buttocks. Relax your shoulders and keep your head level. Avoid arching your back or rounding your shoulders. Using a chair with  arms may help you keep your balance.  · Raise your arms, elbows bent, to shoulder height.  · Slowly move your forearms together. Hold for 5 seconds.  · Return to starting position. Repeat 5 times.  Date Last Reviewed: 10/1/2015  © 2912-0951 [employer]. [address] [place] [address]. All rights reserved. This information is not intended as a substitute for professional medical care. Always follow your healthcare professional's instructions.
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
## 2020-11-18 LAB
GAMMA INTERFERON BACKGROUND BLD IA-ACNC: 0.02 IU/ML
M TB IFN-G CD4+ BCKGRND COR BLD-ACNC: 0 IU/ML
MITOGEN IGNF BCKGRD COR BLD-ACNC: 8.6 IU/ML
TB GOLD PLUS: NEGATIVE
TB2 - NIL: 0 IU/ML

## 2020-11-23 ENCOUNTER — TELEPHONE (OUTPATIENT)
Dept: INTERNAL MEDICINE | Facility: CLINIC | Age: 40
End: 2020-11-23

## 2020-11-23 ENCOUNTER — OFFICE VISIT (OUTPATIENT)
Dept: URGENT CARE | Facility: CLINIC | Age: 40
End: 2020-11-23
Payer: COMMERCIAL
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
## 2020-11-23 DIAGNOSIS — E11.29 TYPE 2 DIABETES MELLITUS WITH MICROALBUMINURIA, WITHOUT LONG-TERM CURRENT USE OF INSULIN: ICD-10-CM

## 2020-11-23 DIAGNOSIS — S16.1XXD STRAIN OF NECK MUSCLE, SUBSEQUENT ENCOUNTER: ICD-10-CM

## 2020-11-23 DIAGNOSIS — R80.9 TYPE 2 DIABETES MELLITUS WITH MICROALBUMINURIA, WITHOUT LONG-TERM CURRENT USE OF INSULIN: ICD-10-CM

## 2020-11-23 PROCEDURE — 99213 PR OFFICE/OUTPT VISIT, EST, LEVL III, 20-29 MIN: ICD-10-PCS | Mod: S$GLB,,, | Performed by: NURSE PRACTITIONER

## 2020-11-23 PROCEDURE — 99213 OFFICE O/P EST LOW 20 MIN: CPT | Mod: S$GLB,,, | Performed by: NURSE PRACTITIONER
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
## 2020-11-23 NOTE — TELEPHONE ENCOUNTER
Please inform patient his TB blood test was negative.   His A1c (diabetes lab) is HIGH, meaning his diabetes is uncontrolled. We need to restart his Bydrureon injections. I have sent prescription to his pharmacy. Please let us know if he has trouble getting meds from pharmacy. Bring a blood glucose log to his follow up appointment.     Future Appointments   Date Time Provider Department Center   11/23/2020  1:30 PM OCCUPATIONAL HEALTH, [facility]   12/14/2020 11:00 AM Cassidy Santana PA-C [facility] W   1/27/2021  8:30 AM [facility]  [facility] W   1/27/2021  8:50 AM LAB, SAME DAY [facility] IM [facility] PCW   2/3/2021  3:30 PM Naeem Chamorro MD [facility]       Cassidy Santana PA-C
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
## 2020-11-23 NOTE — TELEPHONE ENCOUNTER
----- Message from Janette Etienne sent at 11/23/2020  2:15 PM CST -----  Regarding: Return call  Contact: [phone] @ Patient  Patient is returning a phone call.  Who left a message for the patient: Lexi Ordoñez LPN   Does patient know what this is regarding:    Comments:

## 2020-12-14 ENCOUNTER — OFFICE VISIT (OUTPATIENT)
Dept: INTERNAL MEDICINE | Facility: CLINIC | Age: 40
End: 2020-12-14
Payer: COMMERCIAL

## 2020-12-14 VITALS
BODY MASS INDEX: 36.32 KG/M2 | DIASTOLIC BLOOD PRESSURE: 86 MMHG | WEIGHT: 292.13 LBS | HEART RATE: 90 BPM | SYSTOLIC BLOOD PRESSURE: 138 MMHG | HEIGHT: 75 IN | OXYGEN SATURATION: 97 %

## 2020-12-14 DIAGNOSIS — E66.01 SEVERE OBESITY WITH BODY MASS INDEX (BMI) OF 35.0 TO 39.9 WITH COMORBIDITY: ICD-10-CM

## 2020-12-14 DIAGNOSIS — R80.9 TYPE 2 DIABETES MELLITUS WITH MICROALBUMINURIA, WITHOUT LONG-TERM CURRENT USE OF INSULIN: ICD-10-CM

## 2020-12-14 DIAGNOSIS — E78.2 MIXED DYSLIPIDEMIA: ICD-10-CM

## 2020-12-14 DIAGNOSIS — I10 ESSENTIAL HYPERTENSION: Primary | ICD-10-CM

## 2020-12-14 DIAGNOSIS — E11.29 TYPE 2 DIABETES MELLITUS WITH MICROALBUMINURIA, WITHOUT LONG-TERM CURRENT USE OF INSULIN: ICD-10-CM

## 2020-12-14 PROCEDURE — 3008F PR BODY MASS INDEX (BMI) DOCUMENTED: ICD-10-PCS | Mod: CPTII,S$GLB,, | Performed by: PHYSICIAN ASSISTANT

## 2020-12-14 PROCEDURE — 3079F PR MOST RECENT DIASTOLIC BLOOD PRESSURE 80-89 MM HG: ICD-10-PCS | Mod: CPTII,S$GLB,, | Performed by: PHYSICIAN ASSISTANT

## 2020-12-14 PROCEDURE — 1126F AMNT PAIN NOTED NONE PRSNT: CPT | Mod: S$GLB,,, | Performed by: PHYSICIAN ASSISTANT

## 2020-12-14 PROCEDURE — 3075F SYST BP GE 130 - 139MM HG: CPT | Mod: CPTII,S$GLB,, | Performed by: PHYSICIAN ASSISTANT

## 2020-12-14 PROCEDURE — 3046F PR MOST RECENT HEMOGLOBIN A1C LEVEL > 9.0%: ICD-10-PCS | Mod: CPTII,S$GLB,, | Performed by: PHYSICIAN ASSISTANT

## 2020-12-14 PROCEDURE — 3008F BODY MASS INDEX DOCD: CPT | Mod: CPTII,S$GLB,, | Performed by: PHYSICIAN ASSISTANT

## 2020-12-14 PROCEDURE — 99214 PR OFFICE/OUTPT VISIT, EST, LEVL IV, 30-39 MIN: ICD-10-PCS | Mod: S$GLB,,, | Performed by: PHYSICIAN ASSISTANT

## 2020-12-14 PROCEDURE — 3075F PR MOST RECENT SYSTOLIC BLOOD PRESS GE 130-139MM HG: ICD-10-PCS | Mod: CPTII,S$GLB,, | Performed by: PHYSICIAN ASSISTANT

## 2020-12-14 PROCEDURE — 3046F HEMOGLOBIN A1C LEVEL >9.0%: CPT | Mod: CPTII,S$GLB,, | Performed by: PHYSICIAN ASSISTANT

## 2020-12-14 PROCEDURE — 99999 PR PBB SHADOW E&M-EST. PATIENT-LVL V: ICD-10-PCS | Mod: PBBFAC,,, | Performed by: PHYSICIAN ASSISTANT

## 2020-12-14 PROCEDURE — 1126F PR PAIN SEVERITY QUANTIFIED, NO PAIN PRESENT: ICD-10-PCS | Mod: S$GLB,,, | Performed by: PHYSICIAN ASSISTANT

## 2020-12-14 PROCEDURE — 99214 OFFICE O/P EST MOD 30 MIN: CPT | Mod: S$GLB,,, | Performed by: PHYSICIAN ASSISTANT

## 2020-12-14 PROCEDURE — 99999 PR PBB SHADOW E&M-EST. PATIENT-LVL V: CPT | Mod: PBBFAC,,, | Performed by: PHYSICIAN ASSISTANT

## 2020-12-14 PROCEDURE — 3079F DIAST BP 80-89 MM HG: CPT | Mod: CPTII,S$GLB,, | Performed by: PHYSICIAN ASSISTANT

## 2020-12-14 RX ORDER — ATORVASTATIN CALCIUM 40 MG/1
40 TABLET, FILM COATED ORAL DAILY
Qty: 90 TABLET | Refills: 0 | Status: SHIPPED | OUTPATIENT
Start: 2020-12-14 | End: 2021-03-17 | Stop reason: SDUPTHER

## 2020-12-14 NOTE — PATIENT INSTRUCTIONS
Established High Blood Pressure    High blood pressure (hypertension) is a chronic disease. Often, healthcare providers dont know what causes it. But it can be caused by certain health conditions and medicines.  If you have high blood pressure, you may not have any symptoms. If you do have symptoms, they may include headache, dizziness, changes in your vision, chest pain, and shortness of breath. But even without symptoms, high blood pressure thats not treated raises your risk for heart attack and stroke. High blood pressure is a serious health risk and shouldnt be ignored.  A blood pressure reading is made up of two numbers: a higher number over a lower number. The top number is the systolic pressure. The bottom number is the diastolic pressure. A normal blood pressure is a systolic pressure of  less than 120 over a diastolic pressure of less than 80. You will see your blood pressure readings written together. For example, a person with a systolic pressure of 188 and a diastolic pressure of 78 will have 118/78 written in the medical record.  High blood pressure is when either the top number is 140 or higher, or the bottom number is 90 or higher. This must be the result when taking your blood pressure a number of times. The blood pressures between normal and high are called prehypertension.  Home care  If you have high blood pressure, you should do what is listed below to lower your blood pressure. If you are taking medicines for high blood pressure, these methods may reduce or end your need for medicines in the future.  · Begin a weight-loss program if you are overweight.  · Cut back on how much salt you get in your diet. Heres how to do this:  ¨ Dont eat foods that have a lot of salt. These include olives, pickles, smoked meats, and salted potato chips.  ¨ Dont add salt to your food at the table.  ¨ Use only small amounts of salt when cooking.  · Start an exercise program. Talk with your healthcare  provider about the type of exercise program that would be best for you. It doesn't have to be hard. Even brisk walking for 20 minutes 3 times a week is a good form of exercise.  · Dont take medicines that stimulate the heart. This includes many over-the-counter cold and sinus decongestant pills and sprays, as well as diet pills. Check the warnings about hypertension on the label. Before buying any over-the-counter medicines or supplements, always ask the pharmacist about the product's potential interaction with your high blood pressure and your high blood pressure medicines.  · Stimulants such as amphetamine or cocaine could be deadly for someone with high blood pressure. Never take these.  · Limit how much caffeine you get in your diet. Switch to caffeine-free products.  · Stop smoking. If you are a long-time smoker, this can be hard. Talk to your healthcare provider about medicines and nicotine replacement options to help you. Also, enroll in a stop-smoking program to make it more likely that you will quit for good.  · Learn how to handle stress. This is an important part of any program to lower blood pressure. Learn about relaxation methods like meditation, yoga, or biofeedback.  · If your provider prescribed medicines, take them exactly as directed. Missing doses may cause your blood pressure get out of control.  · If you miss a dose or doses, check with your healthcare provider or pharmacist about what to do.  · Consider buying an automatic blood pressure machine. Ask your provider for a recommendation. You can get one of these at most pharmacies.     The American Heart Association recommends the following guidelines for home blood pressure monitoring:  · Don't smoke or drink coffee for 30 minutes before taking your blood pressure.  · Go to the bathroom before the test.  · Relax for 5 minutes before taking the measurement.  · Sit with your back supported (don't sit on a couch or soft chair); keep your feet on  the floor uncrossed. Place your arm on a solid flat surface (like a table) with the upper part of the arm at heart level. Place the middle of the cuff directly above the eye of the elbow. Check the monitor's instruction manual for an illustration.  · Take multiple readings. When you measure, take 2 to 3 readings one minute apart and record all of the results.  · Take your blood pressure at the same time every day, or as your healthcare provider recommends.  · Record the date, time, and blood pressure reading.  · Take the record with you to your next medical appointment. If your blood pressure monitor has a built-in memory, simply take the monitor with you to your next appointment.  · Call your provider if you have several high readings. Don't be frightened by a single high blood pressure reading, but if you get several high readings, check in with your healthcare provider.  · Note: When blood pressure reaches a systolic (top number) of 180 or higher OR diastolic (bottom number) of 110 or higher, seek emergency medical treatment.  Follow-up care  You will need to see your healthcare provider regularly. This is to check your blood pressure and to make changes to your medicines. Make a follow-up appointment as directed. Bring the record of your home blood pressure readings to the appointment.  When to seek medical advice  Call your healthcare provider right away if any of these occur:  · Blood pressure reaches a systolic (upper number) of 180 or higher OR a diastolic (bottom number) of 110 or higher  · Chest pain or shortness of breath  · Severe headache  · Throbbing or rushing sound in the ears  · Nosebleed  · Sudden severe pain in your belly (abdomen)  · Extreme drowsiness, confusion, or fainting  · Dizziness or spinning sensation (vertigo)  · Weakness of an arm or leg or one side of the face  · You have problems speaking or seeing   Date Last Reviewed: 12/1/2016  © 8321-7940 [employer]. [address] [place] [address]  [place] [address]. All rights reserved. This information is not intended as a substitute for professional medical care. Always follow your healthcare professional's instructions.

## 2020-12-14 NOTE — PROGRESS NOTES
Subjective:       Patient ID: Claudio Herr is a 40 y.o. male.    Chief Complaint: Follow-up    HPI     Established pt of Naeem Chamorro MD     Here to follow up BP.   Elevatedat last visit, amlodipine 5mg was added. He reports adherence with all meds  Not checking BP at home. No cp, sob, ha, vision changes or edema  BP improved today in clni at 13//86    DM:  Recent A1c returned elevated at 11.5% frmo 6.9% last year. He is off his bydureon. This was restarted at last visit. home glucose in 160s now. Eye exam/Foot exam due. No p/p/p.     Past Medical History:   Diagnosis Date    Diabetes mellitus type II     Hyperlipidemia     Hypertension     Vitamin D deficiency disease      Social History     Tobacco Use    Smoking status: Current Every Day Smoker     Packs/day: 1.00     Years: 8.00     Pack years: 8.00     Types: Cigarettes    Smokeless tobacco: Never Used   Substance Use Topics    Alcohol use: Yes     Comment: occasional    Drug use: No     Review of patient's allergies indicates:   Allergen Reactions    No known drug allergies        Current Outpatient Medications:     amLODIPine (NORVASC) 5 MG tablet, Take 1 tablet (5 mg total) by mouth once daily., Disp: 30 tablet, Rfl: 3    atorvastatin (LIPITOR) 40 MG tablet, Take 1 tablet (40 mg total) by mouth once daily., Disp: 90 tablet, Rfl: 0    blood sugar diagnostic Strp, 1 strip by Misc.(Non-Drug; Combo Route) route once daily., Disp: 100 strip, Rfl: 11    blood-glucose meter kit, Use as instructed, Disp: 1 each, Rfl: 0    BYDUREON 2 mg/0.65 mL PnIj, INJECT 2 MG SUBCUTANEOUSLY ONCE A WEEK, Disp: , Rfl:     exenatide microspheres (BYDUREON) 2 mg ER injection suspension, Inject 2 mg into the skin once a week., Disp: 4 vial, Rfl: 6    hydroCHLOROthiazide (HYDRODIURIL) 25 MG tablet, Take 1 tablet by mouth once daily, Disp: 90 tablet, Rfl: 0    lancets Misc, 1 lancet by Misc.(Non-Drug; Combo Route) route once daily., Disp: 100 each, Rfl: 11     "lancing device Misc, 1 Device by Misc.(Non-Drug; Combo Route) route once daily., Disp: 1 each, Rfl: 0    losartan (COZAAR) 100 MG tablet, Take 1 tablet by mouth once daily, Disp: 90 tablet, Rfl: 0    VITAMIN D2 1,250 mcg (50,000 unit) capsule, TAKE 1 CAPSULE BY MOUTH ONCE A WEEK FOR  VITAMIN  D  DEFICIENCY, Disp: 4 capsule, Rfl: 5        Review of Systems   Constitutional: Negative for chills, fever and unexpected weight change.   Eyes: Negative for visual disturbance.   Respiratory: Negative for cough and shortness of breath.    Cardiovascular: Negative for chest pain and leg swelling.   Gastrointestinal: Negative for abdominal pain, nausea and vomiting.   Integumentary:  Negative for rash.   Neurological: Negative for weakness and light-headedness.         Objective: /86 (BP Location: Right arm, Patient Position: Sitting, BP Method: Large (Manual))   Pulse 90   Ht 6' 3" (1.905 m)   Wt 132.5 kg (292 lb 1.8 oz)   SpO2 97%   BMI 36.51 kg/m²         Physical Exam  Vitals signs reviewed.   Constitutional:       General: He is not in acute distress.     Appearance: Normal appearance. He is well-developed. He is obese.   HENT:      Head: Normocephalic and atraumatic.   Neck:      Vascular: No carotid bruit.   Cardiovascular:      Rate and Rhythm: Normal rate and regular rhythm.      Pulses:           Dorsalis pedis pulses are 2+ on the right side and 2+ on the left side.        Posterior tibial pulses are 2+ on the right side and 2+ on the left side.      Heart sounds: No murmur. No friction rub.   Pulmonary:      Effort: Pulmonary effort is normal.      Breath sounds: Normal breath sounds. No wheezing or rales.   Abdominal:      General: Bowel sounds are normal.      Palpations: Abdomen is soft.      Tenderness: There is no abdominal tenderness.   Musculoskeletal:      Right lower leg: No edema.      Left lower leg: No edema.   Feet:      Right foot:      Protective Sensation: 7 sites tested. 7 sites " sensed.      Skin integrity: Callus and dry skin present. No erythema or warmth. Right foot maceration: maceration b/t toes.      Left foot:      Protective Sensation: 7 sites tested. 7 sites sensed.      Skin integrity: Callus and dry skin present. No erythema. Left foot maceration: maceration b/t toes.   Lymphadenopathy:      Cervical: No cervical adenopathy.   Skin:     General: Skin is warm and dry.      Findings: No rash.   Neurological:      Mental Status: He is alert.   Psychiatric:         Mood and Affect: Mood normal.         Assessment:       1. Essential hypertension    2. Type 2 diabetes mellitus with microalbuminuria, without long-term current use of insulin    3. Severe obesity with body mass index (BMI) of 35.0 to 39.9 with comorbidity    4. Mixed dyslipidemia        Plan:         Claudio was seen today for follow-up.    Diagnoses and all orders for this visit:    Essential hypertension  BP improved  Advised to continue current meds  Losartan 100mg, amlodipine 5mg, and hctz 25mg  BP goals discussed  Keep scheduled PCP f/u    Type 2 diabetes mellitus with microalbuminuria, without long-term current use of insulin  Uncontrolled  Pt was off byetta, this was restarted.   DM diet and lifestyle mods discussed  Keep schedule PCP f/u in Feb   Component      Latest Ref Rng & Units 11/16/2020 10/2/2019   Hemoglobin A1C External      4.0 - 5.6 % 11.5 (H) 6.9 (H)   -     Ambulatory referral/consult to Optometry; Future  -     Ambulatory referral/consult to Podiatry; Future    Severe obesity with body mass index (BMI) of 35.0 to 39.9 with comorbidity  BMI Reviewed  Has lost some weight (approx 7lbs over the past year)  Continue lifestyle mods.       Mixed dyslipidemia  -     atorvastatin (LIPITOR) 40 MG tablet; Take 1 tablet (40 mg total) by mouth once daily.        Future Appointments   Date Time Provider Department Center   12/14/2020 11:00 AM Cassidy Santana PA-C [facility]   1/27/2021  8:30 AM LAB,  SAME DAY [facility] IM [facility]   1/27/2021  8:50 AM LAB, SAME DAY [facility] LAB IM [facility]   2/3/2021  3:30 PM Naeem Chamorro MD [facility]     Cassidy Santana PA-C

## 2020-12-15 ENCOUNTER — PATIENT OUTREACH (OUTPATIENT)
Dept: ADMINISTRATIVE | Facility: OTHER | Age: 40
End: 2020-12-15

## 2020-12-15 NOTE — PROGRESS NOTES
LINKS immunization registry not responding  Care Everywhere updated  Health Maintenance updated  Chart reviewed for overdue [facility] ([facility]) health maintenance testing (CRS, Breast Ca, Diabetic Eye Exam)   Orders entered:N/A

## 2021-01-20 ENCOUNTER — PATIENT OUTREACH (OUTPATIENT)
Dept: ADMINISTRATIVE | Facility: HOSPITAL | Age: 41
End: 2021-01-20

## 2021-01-27 ENCOUNTER — LAB VISIT (OUTPATIENT)
Dept: LAB | Facility: HOSPITAL | Age: 41
End: 2021-01-27
Attending: INTERNAL MEDICINE
Payer: COMMERCIAL

## 2021-01-27 DIAGNOSIS — E55.9 VITAMIN D DEFICIENCY: ICD-10-CM

## 2021-01-27 DIAGNOSIS — E11.29 TYPE 2 DIABETES MELLITUS WITH MICROALBUMINURIA, WITHOUT LONG-TERM CURRENT USE OF INSULIN: ICD-10-CM

## 2021-01-27 DIAGNOSIS — I10 HTN (HYPERTENSION), BENIGN: ICD-10-CM

## 2021-01-27 DIAGNOSIS — E78.2 MIXED DYSLIPIDEMIA: ICD-10-CM

## 2021-01-27 DIAGNOSIS — R80.9 TYPE 2 DIABETES MELLITUS WITH MICROALBUMINURIA, WITHOUT LONG-TERM CURRENT USE OF INSULIN: ICD-10-CM

## 2021-01-27 DIAGNOSIS — E66.01 SEVERE OBESITY WITH BODY MASS INDEX (BMI) OF 35.0 TO 39.9 WITH COMORBIDITY: ICD-10-CM

## 2021-01-27 DIAGNOSIS — Z00.00 ANNUAL PHYSICAL EXAM: ICD-10-CM

## 2021-01-27 LAB
25(OH)D3+25(OH)D2 SERPL-MCNC: 24 NG/ML (ref 30–96)
ALBUMIN SERPL BCP-MCNC: 3.7 G/DL (ref 3.5–5.2)
ALP SERPL-CCNC: 81 U/L (ref 55–135)
ALT SERPL W/O P-5'-P-CCNC: 25 U/L (ref 10–44)
ANION GAP SERPL CALC-SCNC: 9 MMOL/L (ref 8–16)
AST SERPL-CCNC: 17 U/L (ref 10–40)
BILIRUB SERPL-MCNC: 0.4 MG/DL (ref 0.1–1)
BUN SERPL-MCNC: 18 MG/DL (ref 6–20)
CALCIUM SERPL-MCNC: 9.4 MG/DL (ref 8.7–10.5)
CHLORIDE SERPL-SCNC: 104 MMOL/L (ref 95–110)
CHOLEST SERPL-MCNC: 195 MG/DL (ref 120–199)
CHOLEST/HDLC SERPL: 4.3 {RATIO} (ref 2–5)
CO2 SERPL-SCNC: 27 MMOL/L (ref 23–29)
CREAT SERPL-MCNC: 1 MG/DL (ref 0.5–1.4)
ERYTHROCYTE [DISTWIDTH] IN BLOOD BY AUTOMATED COUNT: 13.4 % (ref 11.5–14.5)
EST. GFR  (AFRICAN AMERICAN): >60 ML/MIN/1.73 M^2
EST. GFR  (NON AFRICAN AMERICAN): >60 ML/MIN/1.73 M^2
ESTIMATED AVG GLUCOSE: 223 MG/DL (ref 68–131)
GLUCOSE SERPL-MCNC: 171 MG/DL (ref 70–110)
HBA1C MFR BLD HPLC: 9.4 % (ref 4–5.6)
HCT VFR BLD AUTO: 42.3 % (ref 40–54)
HDLC SERPL-MCNC: 45 MG/DL (ref 40–75)
HDLC SERPL: 23.1 % (ref 20–50)
HGB BLD-MCNC: 13.2 G/DL (ref 14–18)
LDLC SERPL CALC-MCNC: 128.6 MG/DL (ref 63–159)
MCH RBC QN AUTO: 29.5 PG (ref 27–31)
MCHC RBC AUTO-ENTMCNC: 31.2 G/DL (ref 32–36)
MCV RBC AUTO: 94 FL (ref 82–98)
NONHDLC SERPL-MCNC: 150 MG/DL
PLATELET # BLD AUTO: 271 K/UL (ref 150–350)
PMV BLD AUTO: 11.2 FL (ref 9.2–12.9)
POTASSIUM SERPL-SCNC: 4 MMOL/L (ref 3.5–5.1)
PROT SERPL-MCNC: 7.3 G/DL (ref 6–8.4)
RBC # BLD AUTO: 4.48 M/UL (ref 4.6–6.2)
SODIUM SERPL-SCNC: 140 MMOL/L (ref 136–145)
TRIGL SERPL-MCNC: 107 MG/DL (ref 30–150)
WBC # BLD AUTO: 7.56 K/UL (ref 3.9–12.7)

## 2021-01-27 PROCEDURE — 82306 VITAMIN D 25 HYDROXY: CPT

## 2021-01-27 PROCEDURE — 83036 HEMOGLOBIN GLYCOSYLATED A1C: CPT

## 2021-01-27 PROCEDURE — 80053 COMPREHEN METABOLIC PANEL: CPT

## 2021-01-27 PROCEDURE — 80061 LIPID PANEL: CPT

## 2021-01-27 PROCEDURE — 85027 COMPLETE CBC AUTOMATED: CPT

## 2021-01-27 PROCEDURE — 36415 COLL VENOUS BLD VENIPUNCTURE: CPT

## 2021-01-29 ENCOUNTER — TELEPHONE (OUTPATIENT)
Dept: INTERNAL MEDICINE | Facility: CLINIC | Age: 41
End: 2021-01-29

## 2021-02-03 ENCOUNTER — OFFICE VISIT (OUTPATIENT)
Dept: OPTOMETRY | Facility: CLINIC | Age: 41
End: 2021-02-03
Payer: COMMERCIAL

## 2021-02-03 ENCOUNTER — OFFICE VISIT (OUTPATIENT)
Dept: INTERNAL MEDICINE | Facility: CLINIC | Age: 41
End: 2021-02-03
Payer: COMMERCIAL

## 2021-02-03 VITALS
SYSTOLIC BLOOD PRESSURE: 142 MMHG | WEIGHT: 288 LBS | BODY MASS INDEX: 35.81 KG/M2 | HEIGHT: 75 IN | DIASTOLIC BLOOD PRESSURE: 96 MMHG | OXYGEN SATURATION: 99 % | HEART RATE: 73 BPM

## 2021-02-03 DIAGNOSIS — Z00.00 ANNUAL PHYSICAL EXAM: Primary | ICD-10-CM

## 2021-02-03 DIAGNOSIS — E11.29 TYPE 2 DIABETES MELLITUS WITH MICROALBUMINURIA, WITHOUT LONG-TERM CURRENT USE OF INSULIN: ICD-10-CM

## 2021-02-03 DIAGNOSIS — H52.13 MYOPIA OF BOTH EYES WITH REGULAR ASTIGMATISM: Primary | ICD-10-CM

## 2021-02-03 DIAGNOSIS — I10 ESSENTIAL HYPERTENSION: ICD-10-CM

## 2021-02-03 DIAGNOSIS — E11.9 TYPE 2 DIABETES MELLITUS WITHOUT RETINOPATHY: ICD-10-CM

## 2021-02-03 DIAGNOSIS — R80.9 TYPE 2 DIABETES MELLITUS WITH MICROALBUMINURIA, WITHOUT LONG-TERM CURRENT USE OF INSULIN: ICD-10-CM

## 2021-02-03 DIAGNOSIS — H52.223 MYOPIA OF BOTH EYES WITH REGULAR ASTIGMATISM: Primary | ICD-10-CM

## 2021-02-03 DIAGNOSIS — H35.412 LATTICE DEGENERATION OF LEFT RETINA: ICD-10-CM

## 2021-02-03 DIAGNOSIS — E55.9 VITAMIN D DEFICIENCY: ICD-10-CM

## 2021-02-03 PROCEDURE — 99395 PREV VISIT EST AGE 18-39: CPT | Mod: S$GLB,,, | Performed by: PHYSICIAN ASSISTANT

## 2021-02-03 PROCEDURE — 1126F PR PAIN SEVERITY QUANTIFIED, NO PAIN PRESENT: ICD-10-PCS | Mod: S$GLB,,, | Performed by: PHYSICIAN ASSISTANT

## 2021-02-03 PROCEDURE — 1126F AMNT PAIN NOTED NONE PRSNT: CPT | Mod: S$GLB,,, | Performed by: PHYSICIAN ASSISTANT

## 2021-02-03 PROCEDURE — 99395 PR PREVENTIVE VISIT,EST,18-39: ICD-10-PCS | Mod: S$GLB,,, | Performed by: PHYSICIAN ASSISTANT

## 2021-02-03 PROCEDURE — 3077F PR MOST RECENT SYSTOLIC BLOOD PRESSURE >= 140 MM HG: ICD-10-PCS | Mod: CPTII,S$GLB,, | Performed by: PHYSICIAN ASSISTANT

## 2021-02-03 PROCEDURE — 3080F DIAST BP >= 90 MM HG: CPT | Mod: CPTII,S$GLB,, | Performed by: PHYSICIAN ASSISTANT

## 2021-02-03 PROCEDURE — 3008F PR BODY MASS INDEX (BMI) DOCUMENTED: ICD-10-PCS | Mod: CPTII,S$GLB,, | Performed by: PHYSICIAN ASSISTANT

## 2021-02-03 PROCEDURE — 99999 PR PBB SHADOW E&M-EST. PATIENT-LVL III: CPT | Mod: PBBFAC,,, | Performed by: OPTOMETRIST

## 2021-02-03 PROCEDURE — 92004 PR EYE EXAM, NEW PATIENT,COMPREHESV: ICD-10-PCS | Mod: S$GLB,,, | Performed by: OPTOMETRIST

## 2021-02-03 PROCEDURE — 3046F PR MOST RECENT HEMOGLOBIN A1C LEVEL > 9.0%: ICD-10-PCS | Mod: CPTII,S$GLB,, | Performed by: PHYSICIAN ASSISTANT

## 2021-02-03 PROCEDURE — 99999 PR PBB SHADOW E&M-EST. PATIENT-LVL III: ICD-10-PCS | Mod: PBBFAC,,, | Performed by: OPTOMETRIST

## 2021-02-03 PROCEDURE — 99999 PR PBB SHADOW E&M-EST. PATIENT-LVL V: CPT | Mod: PBBFAC,,, | Performed by: PHYSICIAN ASSISTANT

## 2021-02-03 PROCEDURE — 3077F SYST BP >= 140 MM HG: CPT | Mod: CPTII,S$GLB,, | Performed by: PHYSICIAN ASSISTANT

## 2021-02-03 PROCEDURE — 92004 COMPRE OPH EXAM NEW PT 1/>: CPT | Mod: S$GLB,,, | Performed by: OPTOMETRIST

## 2021-02-03 PROCEDURE — 92015 DETERMINE REFRACTIVE STATE: CPT | Mod: S$GLB,,, | Performed by: OPTOMETRIST

## 2021-02-03 PROCEDURE — 3046F HEMOGLOBIN A1C LEVEL >9.0%: CPT | Mod: CPTII,S$GLB,, | Performed by: PHYSICIAN ASSISTANT

## 2021-02-03 PROCEDURE — 99999 PR PBB SHADOW E&M-EST. PATIENT-LVL V: ICD-10-PCS | Mod: PBBFAC,,, | Performed by: PHYSICIAN ASSISTANT

## 2021-02-03 PROCEDURE — 92015 PR REFRACTION: ICD-10-PCS | Mod: S$GLB,,, | Performed by: OPTOMETRIST

## 2021-02-03 PROCEDURE — 3080F PR MOST RECENT DIASTOLIC BLOOD PRESSURE >= 90 MM HG: ICD-10-PCS | Mod: CPTII,S$GLB,, | Performed by: PHYSICIAN ASSISTANT

## 2021-02-03 PROCEDURE — 3008F BODY MASS INDEX DOCD: CPT | Mod: CPTII,S$GLB,, | Performed by: PHYSICIAN ASSISTANT

## 2021-02-03 RX ORDER — ERGOCALCIFEROL 1.25 MG/1
50000 CAPSULE ORAL
COMMUNITY
End: 2021-02-28

## 2021-02-09 ENCOUNTER — TELEPHONE (OUTPATIENT)
Dept: INTERNAL MEDICINE | Facility: CLINIC | Age: 41
End: 2021-02-09

## 2021-02-09 RX ORDER — DULAGLUTIDE 1.5 MG/.5ML
1.5 INJECTION, SOLUTION SUBCUTANEOUS
Qty: 2 ML | Refills: 6 | Status: SHIPPED | OUTPATIENT
Start: 2021-02-09 | End: 2021-08-26

## 2021-02-21 NOTE — PATIENT INSTRUCTIONS
Please follow the instructions below to securely access your online medical record. [facility] allows you to send messages to your doctor, view your test results, renew your prescriptions, schedule appointments, and more.     How Do I Sign Up?  1. In your Internet browser, go to [email or web address]  2. In the lower right of the page, click the Activate Now link located under the Have Access Code? .  3. Enter your [facility] Access Code exactly as it appears below. You will not need to use this code after youve completed the sign-up process.  [facility] Access Code: [email or web address]  Expires: 11/16/2019  8:43 AM    4. Enter Date of Birth (mm/dd/yyyy) as indicated and click the Next button. You will be taken to the next sign-up page.  5. Create a [facility] ID. This will be your new [facility] login ID and cannot be changed, so think of one that is secure and easy to remember.  6. Create a [facility] password.  Your password must be at least 8 characters long and contain at least 1 letter and 1 number.  You can change your password at any time.  Your password is case sensitive.  7. Enter your Password Reset Question and Answer, then click the Next button.   8. Enter your e-mail address. You will receive e-mail notification when new information is available in [facility].  9. Click Sign Up. You can now view your medical record.     Additional Information  If you have questions, you can e-mail [email or web address] or call [phone] to talk to our [facility] staff. Remember, [facility] is NOT to be used for urgent needs. For medical emergencies, dial 911.    Thank you for enrolling in [facility].         
Patient requests all Lab and Radiology Results on their Discharge Instructions

## 2021-02-23 DIAGNOSIS — I10 HTN (HYPERTENSION), BENIGN: ICD-10-CM

## 2021-02-28 RX ORDER — HYDROCHLOROTHIAZIDE 25 MG/1
TABLET ORAL
Qty: 90 TABLET | Refills: 0 | Status: SHIPPED | OUTPATIENT
Start: 2021-02-28 | End: 2021-03-09 | Stop reason: SDUPTHER

## 2021-02-28 RX ORDER — ERGOCALCIFEROL 1.25 MG/1
CAPSULE ORAL
Qty: 4 CAPSULE | Refills: 0 | Status: SHIPPED | OUTPATIENT
Start: 2021-02-28 | End: 2021-03-09 | Stop reason: SDUPTHER

## 2021-02-28 RX ORDER — LOSARTAN POTASSIUM 100 MG/1
TABLET ORAL
Qty: 90 TABLET | Refills: 0 | Status: SHIPPED | OUTPATIENT
Start: 2021-02-28 | End: 2021-03-09 | Stop reason: SDUPTHER

## 2021-03-09 DIAGNOSIS — I10 HTN (HYPERTENSION), BENIGN: ICD-10-CM

## 2021-03-10 RX ORDER — ERGOCALCIFEROL 1.25 MG/1
CAPSULE ORAL
Qty: 4 CAPSULE | Refills: 0 | Status: SHIPPED | OUTPATIENT
Start: 2021-03-10 | End: 2022-01-11

## 2021-03-10 RX ORDER — HYDROCHLOROTHIAZIDE 25 MG/1
25 TABLET ORAL DAILY
Qty: 90 TABLET | Refills: 0 | Status: SHIPPED | OUTPATIENT
Start: 2021-03-10 | End: 2022-06-27

## 2021-03-10 RX ORDER — LOSARTAN POTASSIUM 100 MG/1
100 TABLET ORAL DAILY
Qty: 90 TABLET | Refills: 0 | Status: SHIPPED | OUTPATIENT
Start: 2021-03-10 | End: 2021-07-01

## 2021-03-17 ENCOUNTER — OFFICE VISIT (OUTPATIENT)
Dept: INTERNAL MEDICINE | Facility: CLINIC | Age: 41
End: 2021-03-17
Payer: COMMERCIAL

## 2021-03-17 VITALS
WEIGHT: 274.5 LBS | HEIGHT: 74 IN | DIASTOLIC BLOOD PRESSURE: 82 MMHG | HEART RATE: 97 BPM | BODY MASS INDEX: 35.23 KG/M2 | OXYGEN SATURATION: 96 % | SYSTOLIC BLOOD PRESSURE: 130 MMHG

## 2021-03-17 DIAGNOSIS — E66.01 SEVERE OBESITY (BMI 35.0-35.9 WITH COMORBIDITY): ICD-10-CM

## 2021-03-17 DIAGNOSIS — E78.2 MIXED DYSLIPIDEMIA: ICD-10-CM

## 2021-03-17 DIAGNOSIS — I10 ESSENTIAL HYPERTENSION: Primary | ICD-10-CM

## 2021-03-17 DIAGNOSIS — E11.29 TYPE 2 DIABETES MELLITUS WITH MICROALBUMINURIA, WITHOUT LONG-TERM CURRENT USE OF INSULIN: ICD-10-CM

## 2021-03-17 DIAGNOSIS — R80.9 TYPE 2 DIABETES MELLITUS WITH MICROALBUMINURIA, WITHOUT LONG-TERM CURRENT USE OF INSULIN: ICD-10-CM

## 2021-03-17 PROCEDURE — 3046F HEMOGLOBIN A1C LEVEL >9.0%: CPT | Mod: CPTII,S$GLB,, | Performed by: PHYSICIAN ASSISTANT

## 2021-03-17 PROCEDURE — 3075F PR MOST RECENT SYSTOLIC BLOOD PRESS GE 130-139MM HG: ICD-10-PCS | Mod: CPTII,S$GLB,, | Performed by: PHYSICIAN ASSISTANT

## 2021-03-17 PROCEDURE — 3075F SYST BP GE 130 - 139MM HG: CPT | Mod: CPTII,S$GLB,, | Performed by: PHYSICIAN ASSISTANT

## 2021-03-17 PROCEDURE — 3008F BODY MASS INDEX DOCD: CPT | Mod: CPTII,S$GLB,, | Performed by: PHYSICIAN ASSISTANT

## 2021-03-17 PROCEDURE — 99999 PR PBB SHADOW E&M-EST. PATIENT-LVL III: ICD-10-PCS | Mod: PBBFAC,,, | Performed by: PHYSICIAN ASSISTANT

## 2021-03-17 PROCEDURE — 3046F PR MOST RECENT HEMOGLOBIN A1C LEVEL > 9.0%: ICD-10-PCS | Mod: CPTII,S$GLB,, | Performed by: PHYSICIAN ASSISTANT

## 2021-03-17 PROCEDURE — 3079F DIAST BP 80-89 MM HG: CPT | Mod: CPTII,S$GLB,, | Performed by: PHYSICIAN ASSISTANT

## 2021-03-17 PROCEDURE — 99214 OFFICE O/P EST MOD 30 MIN: CPT | Mod: S$GLB,,, | Performed by: PHYSICIAN ASSISTANT

## 2021-03-17 PROCEDURE — 3008F PR BODY MASS INDEX (BMI) DOCUMENTED: ICD-10-PCS | Mod: CPTII,S$GLB,, | Performed by: PHYSICIAN ASSISTANT

## 2021-03-17 PROCEDURE — 3079F PR MOST RECENT DIASTOLIC BLOOD PRESSURE 80-89 MM HG: ICD-10-PCS | Mod: CPTII,S$GLB,, | Performed by: PHYSICIAN ASSISTANT

## 2021-03-17 PROCEDURE — 99999 PR PBB SHADOW E&M-EST. PATIENT-LVL III: CPT | Mod: PBBFAC,,, | Performed by: PHYSICIAN ASSISTANT

## 2021-03-17 PROCEDURE — 99214 PR OFFICE/OUTPT VISIT, EST, LEVL IV, 30-39 MIN: ICD-10-PCS | Mod: S$GLB,,, | Performed by: PHYSICIAN ASSISTANT

## 2021-03-17 RX ORDER — AMLODIPINE BESYLATE 5 MG/1
5 TABLET ORAL DAILY
Qty: 90 TABLET | Refills: 3 | Status: SHIPPED | OUTPATIENT
Start: 2021-03-17 | End: 2022-03-23 | Stop reason: SDUPTHER

## 2021-03-17 RX ORDER — ATORVASTATIN CALCIUM 40 MG/1
40 TABLET, FILM COATED ORAL DAILY
Qty: 90 TABLET | Refills: 3 | Status: SHIPPED | OUTPATIENT
Start: 2021-03-17 | End: 2022-03-23 | Stop reason: SDUPTHER

## 2021-03-29 ENCOUNTER — IMMUNIZATION (OUTPATIENT)
Dept: PRIMARY CARE CLINIC | Facility: CLINIC | Age: 41
End: 2021-03-29
Payer: COMMERCIAL

## 2021-03-29 DIAGNOSIS — Z23 NEED FOR VACCINATION: Primary | ICD-10-CM

## 2021-03-29 PROCEDURE — 91301 PR SARS-COV-2 COVID-19 VACCINE, NO PRSV, 100MCG/0.5ML, IM: CPT | Mod: S$GLB,,, | Performed by: INTERNAL MEDICINE

## 2021-03-29 PROCEDURE — 0011A PR IMMUNIZ ADMIN, SARS-COV-2 COVID-19 VACC, 100MCG/0.5ML, 1ST DOSE: ICD-10-PCS | Mod: CV19,S$GLB,, | Performed by: INTERNAL MEDICINE

## 2021-03-29 PROCEDURE — 91301 PR SARS-COV-2 COVID-19 VACCINE, NO PRSV, 100MCG/0.5ML, IM: ICD-10-PCS | Mod: S$GLB,,, | Performed by: INTERNAL MEDICINE

## 2021-03-29 PROCEDURE — 0011A PR IMMUNIZ ADMIN, SARS-COV-2 COVID-19 VACC, 100MCG/0.5ML, 1ST DOSE: CPT | Mod: CV19,S$GLB,, | Performed by: INTERNAL MEDICINE

## 2021-03-29 RX ADMIN — Medication 0.5 ML: at 04:03

## 2021-04-28 ENCOUNTER — IMMUNIZATION (OUTPATIENT)
Dept: PRIMARY CARE CLINIC | Facility: CLINIC | Age: 41
End: 2021-04-28
Payer: COMMERCIAL

## 2021-04-28 DIAGNOSIS — Z23 NEED FOR VACCINATION: Primary | ICD-10-CM

## 2021-04-28 PROCEDURE — 0012A PR IMMUNIZ ADMIN, SARS-COV-2 COVID-19 VACC, 100MCG/0.5ML, 2ND DOSE: ICD-10-PCS | Mod: CV19,S$GLB,, | Performed by: INTERNAL MEDICINE

## 2021-04-28 PROCEDURE — 0012A PR IMMUNIZ ADMIN, SARS-COV-2 COVID-19 VACC, 100MCG/0.5ML, 2ND DOSE: CPT | Mod: CV19,S$GLB,, | Performed by: INTERNAL MEDICINE

## 2021-04-28 PROCEDURE — 91301 PR SARS-COV-2 COVID-19 VACCINE, NO PRSV, 100MCG/0.5ML, IM: ICD-10-PCS | Mod: S$GLB,,, | Performed by: INTERNAL MEDICINE

## 2021-04-28 PROCEDURE — 91301 PR SARS-COV-2 COVID-19 VACCINE, NO PRSV, 100MCG/0.5ML, IM: CPT | Mod: S$GLB,,, | Performed by: INTERNAL MEDICINE

## 2021-04-28 RX ADMIN — Medication 0.5 ML: at 05:04

## 2021-05-03 ENCOUNTER — TELEPHONE (OUTPATIENT)
Dept: INTERNAL MEDICINE | Facility: CLINIC | Age: 41
End: 2021-05-03

## 2021-05-03 ENCOUNTER — LAB VISIT (OUTPATIENT)
Dept: LAB | Facility: HOSPITAL | Age: 41
End: 2021-05-03
Attending: INTERNAL MEDICINE
Payer: COMMERCIAL

## 2021-05-03 DIAGNOSIS — R80.9 TYPE 2 DIABETES MELLITUS WITH MICROALBUMINURIA, WITHOUT LONG-TERM CURRENT USE OF INSULIN: ICD-10-CM

## 2021-05-03 DIAGNOSIS — E11.29 TYPE 2 DIABETES MELLITUS WITH MICROALBUMINURIA, WITHOUT LONG-TERM CURRENT USE OF INSULIN: ICD-10-CM

## 2021-05-03 LAB
ESTIMATED AVG GLUCOSE: 131 MG/DL (ref 68–131)
HBA1C MFR BLD: 6.2 % (ref 4–5.6)

## 2021-05-03 PROCEDURE — 36415 COLL VENOUS BLD VENIPUNCTURE: CPT | Performed by: PHYSICIAN ASSISTANT

## 2021-05-03 PROCEDURE — 83036 HEMOGLOBIN GLYCOSYLATED A1C: CPT | Performed by: PHYSICIAN ASSISTANT

## 2021-08-05 ENCOUNTER — LAB VISIT (OUTPATIENT)
Dept: INTERNAL MEDICINE | Facility: CLINIC | Age: 41
End: 2021-08-05
Payer: COMMERCIAL

## 2021-08-05 ENCOUNTER — TELEPHONE (OUTPATIENT)
Dept: INTERNAL MEDICINE | Facility: CLINIC | Age: 41
End: 2021-08-05

## 2021-08-05 DIAGNOSIS — Z20.822 EXPOSURE TO COVID-19 VIRUS: Primary | ICD-10-CM

## 2021-08-05 DIAGNOSIS — Z20.822 EXPOSURE TO COVID-19 VIRUS: ICD-10-CM

## 2021-08-05 PROCEDURE — U0005 INFEC AGEN DETEC AMPLI PROBE: HCPCS | Performed by: PHYSICIAN ASSISTANT

## 2021-08-05 PROCEDURE — U0003 INFECTIOUS AGENT DETECTION BY NUCLEIC ACID (DNA OR RNA); SEVERE ACUTE RESPIRATORY SYNDROME CORONAVIRUS 2 (SARS-COV-2) (CORONAVIRUS DISEASE [COVID-19]), AMPLIFIED PROBE TECHNIQUE, MAKING USE OF HIGH THROUGHPUT TECHNOLOGIES AS DESCRIBED BY CMS-2020-01-R: HCPCS | Performed by: PHYSICIAN ASSISTANT

## 2021-08-06 LAB
SARS-COV-2 RNA RESP QL NAA+PROBE: NOT DETECTED
SARS-COV-2- CYCLE NUMBER: -1

## 2021-11-22 ENCOUNTER — OCCUPATIONAL HEALTH (OUTPATIENT)
Dept: URGENT CARE | Facility: CLINIC | Age: 41
End: 2021-11-22

## 2021-11-22 DIAGNOSIS — Z23 ENCOUNTER FOR IMMUNIZATION: Primary | ICD-10-CM

## 2021-11-22 PROCEDURE — 90686 IIV4 VACC NO PRSV 0.5 ML IM: CPT | Mod: S$GLB,,, | Performed by: NURSE PRACTITIONER

## 2021-11-22 PROCEDURE — 90686 FLU VACCINE (QUAD) GREATER THAN OR EQUAL TO 3YO PRESERVATIVE FREE IM: ICD-10-PCS | Mod: S$GLB,,, | Performed by: NURSE PRACTITIONER

## 2021-11-22 PROCEDURE — 86580 POCT TB SKIN TEST: ICD-10-PCS | Mod: S$GLB,,, | Performed by: NURSE PRACTITIONER

## 2021-11-22 PROCEDURE — 86580 TB INTRADERMAL TEST: CPT | Mod: S$GLB,,, | Performed by: NURSE PRACTITIONER

## 2021-11-24 LAB
TB INDURATION - 48 HR READ: NORMAL
TB INDURATION - 72 HR READ: NORMAL
TB SKIN TEST - 48 HR READ: NEGATIVE
TB SKIN TEST - 72 HR READ: NORMAL

## 2021-12-21 DIAGNOSIS — R80.9 TYPE 2 DIABETES MELLITUS WITH MICROALBUMINURIA, WITHOUT LONG-TERM CURRENT USE OF INSULIN: ICD-10-CM

## 2021-12-21 DIAGNOSIS — E78.2 MIXED DYSLIPIDEMIA: ICD-10-CM

## 2021-12-21 DIAGNOSIS — I10 HTN (HYPERTENSION), BENIGN: ICD-10-CM

## 2021-12-21 DIAGNOSIS — I10 ESSENTIAL HYPERTENSION: ICD-10-CM

## 2021-12-21 DIAGNOSIS — E11.29 TYPE 2 DIABETES MELLITUS WITH MICROALBUMINURIA, WITHOUT LONG-TERM CURRENT USE OF INSULIN: ICD-10-CM

## 2021-12-22 NOTE — TELEPHONE ENCOUNTER
No new care gaps identified.  Powered by [employer]. Reference number: [id number].   12/21/2021 9:54:53 PM CST

## 2021-12-22 NOTE — TELEPHONE ENCOUNTER
Encounter details require adjustment(s)/ updating by OR Staff  As of this time CDM: did not populate or display   Adjustment(s) made: Provider  CDM should display. Medication(s) delegated by the OR.  Will resend refill request encounter to P Centralized Refill Staff Pool.   [facility]   Note composed:9:54 PM 12/21/2021

## 2021-12-24 NOTE — TELEPHONE ENCOUNTER
This Rx Request does not qualify for assessment with the OR.  Please review protocol details and the Care Due Message for extra clinical information    Reasons Rx Request may be deferred:  1. Labs/Vitals overdue  2. Labs/Vitals abnormal  3. Patient has been seen in the ED/Hospital since the last PCP visit  4. Medication is outside of scope

## 2022-01-03 ENCOUNTER — TELEPHONE (OUTPATIENT)
Dept: INTERNAL MEDICINE | Facility: CLINIC | Age: 42
End: 2022-01-03
Payer: COMMERCIAL

## 2022-01-03 DIAGNOSIS — I10 ESSENTIAL HYPERTENSION: ICD-10-CM

## 2022-01-03 DIAGNOSIS — R80.9 TYPE 2 DIABETES MELLITUS WITH MICROALBUMINURIA, WITHOUT LONG-TERM CURRENT USE OF INSULIN: Primary | ICD-10-CM

## 2022-01-03 DIAGNOSIS — E11.29 TYPE 2 DIABETES MELLITUS WITH MICROALBUMINURIA, WITHOUT LONG-TERM CURRENT USE OF INSULIN: Primary | ICD-10-CM

## 2022-01-03 DIAGNOSIS — E78.2 MIXED DYSLIPIDEMIA: ICD-10-CM

## 2022-01-03 NOTE — TELEPHONE ENCOUNTER
----- Message from Lily Amor sent at 1/3/2022  7:54 AM CST -----  Contact: Pt @[phone]  Doctor appointment and lab have been scheduled.  Please link lab orders to the lab appointment.    Date of doctor appointment:   1/11    Date of lab appointment:  1/5    Physical or F/U:  Physical

## 2022-01-05 ENCOUNTER — LAB VISIT (OUTPATIENT)
Dept: LAB | Facility: HOSPITAL | Age: 42
End: 2022-01-05
Payer: COMMERCIAL

## 2022-01-05 DIAGNOSIS — E78.2 MIXED DYSLIPIDEMIA: ICD-10-CM

## 2022-01-05 DIAGNOSIS — E11.29 TYPE 2 DIABETES MELLITUS WITH MICROALBUMINURIA, WITHOUT LONG-TERM CURRENT USE OF INSULIN: ICD-10-CM

## 2022-01-05 DIAGNOSIS — R80.9 TYPE 2 DIABETES MELLITUS WITH MICROALBUMINURIA, WITHOUT LONG-TERM CURRENT USE OF INSULIN: ICD-10-CM

## 2022-01-05 LAB
ALBUMIN SERPL BCP-MCNC: 3.7 G/DL (ref 3.5–5.2)
ALP SERPL-CCNC: 61 U/L (ref 55–135)
ALT SERPL W/O P-5'-P-CCNC: 35 U/L (ref 10–44)
ANION GAP SERPL CALC-SCNC: 9 MMOL/L (ref 8–16)
AST SERPL-CCNC: 30 U/L (ref 10–40)
BASOPHILS # BLD AUTO: 0.03 K/UL (ref 0–0.2)
BASOPHILS NFR BLD: 0.7 % (ref 0–1.9)
BILIRUB SERPL-MCNC: 0.2 MG/DL (ref 0.1–1)
BUN SERPL-MCNC: 18 MG/DL (ref 6–20)
CALCIUM SERPL-MCNC: 9.3 MG/DL (ref 8.7–10.5)
CHLORIDE SERPL-SCNC: 106 MMOL/L (ref 95–110)
CHOLEST SERPL-MCNC: 130 MG/DL (ref 120–199)
CHOLEST/HDLC SERPL: 3.2 {RATIO} (ref 2–5)
CO2 SERPL-SCNC: 27 MMOL/L (ref 23–29)
CREAT SERPL-MCNC: 1.1 MG/DL (ref 0.5–1.4)
DIFFERENTIAL METHOD: ABNORMAL
EOSINOPHIL # BLD AUTO: 0 K/UL (ref 0–0.5)
EOSINOPHIL NFR BLD: 0.9 % (ref 0–8)
ERYTHROCYTE [DISTWIDTH] IN BLOOD BY AUTOMATED COUNT: 14.4 % (ref 11.5–14.5)
EST. GFR  (AFRICAN AMERICAN): >60 ML/MIN/1.73 M^2
EST. GFR  (NON AFRICAN AMERICAN): >60 ML/MIN/1.73 M^2
ESTIMATED AVG GLUCOSE: 105 MG/DL (ref 68–131)
GLUCOSE SERPL-MCNC: 91 MG/DL (ref 70–110)
HBA1C MFR BLD: 5.3 % (ref 4–5.6)
HCT VFR BLD AUTO: 43.8 % (ref 40–54)
HDLC SERPL-MCNC: 41 MG/DL (ref 40–75)
HDLC SERPL: 31.5 % (ref 20–50)
HGB BLD-MCNC: 13.8 G/DL (ref 14–18)
IMM GRANULOCYTES # BLD AUTO: 0.01 K/UL (ref 0–0.04)
IMM GRANULOCYTES NFR BLD AUTO: 0.2 % (ref 0–0.5)
LDLC SERPL CALC-MCNC: 74 MG/DL (ref 63–159)
LYMPHOCYTES # BLD AUTO: 1.4 K/UL (ref 1–4.8)
LYMPHOCYTES NFR BLD: 32.9 % (ref 18–48)
MCH RBC QN AUTO: 31.2 PG (ref 27–31)
MCHC RBC AUTO-ENTMCNC: 31.5 G/DL (ref 32–36)
MCV RBC AUTO: 99 FL (ref 82–98)
MONOCYTES # BLD AUTO: 0.6 K/UL (ref 0.3–1)
MONOCYTES NFR BLD: 14.6 % (ref 4–15)
NEUTROPHILS # BLD AUTO: 2.2 K/UL (ref 1.8–7.7)
NEUTROPHILS NFR BLD: 50.7 % (ref 38–73)
NONHDLC SERPL-MCNC: 89 MG/DL
NRBC BLD-RTO: 0 /100 WBC
PLATELET # BLD AUTO: 206 K/UL (ref 150–450)
PMV BLD AUTO: 11.5 FL (ref 9.2–12.9)
POTASSIUM SERPL-SCNC: 4.2 MMOL/L (ref 3.5–5.1)
PROT SERPL-MCNC: 7.3 G/DL (ref 6–8.4)
RBC # BLD AUTO: 4.42 M/UL (ref 4.6–6.2)
SODIUM SERPL-SCNC: 142 MMOL/L (ref 136–145)
TRIGL SERPL-MCNC: 75 MG/DL (ref 30–150)
WBC # BLD AUTO: 4.31 K/UL (ref 3.9–12.7)

## 2022-01-05 PROCEDURE — 80053 COMPREHEN METABOLIC PANEL: CPT | Performed by: PHYSICIAN ASSISTANT

## 2022-01-05 PROCEDURE — 36415 COLL VENOUS BLD VENIPUNCTURE: CPT | Performed by: PHYSICIAN ASSISTANT

## 2022-01-05 PROCEDURE — 80061 LIPID PANEL: CPT | Performed by: PHYSICIAN ASSISTANT

## 2022-01-05 PROCEDURE — 85025 COMPLETE CBC W/AUTO DIFF WBC: CPT | Performed by: PHYSICIAN ASSISTANT

## 2022-01-05 PROCEDURE — 83036 HEMOGLOBIN GLYCOSYLATED A1C: CPT | Performed by: PHYSICIAN ASSISTANT

## 2022-01-11 ENCOUNTER — OFFICE VISIT (OUTPATIENT)
Dept: INTERNAL MEDICINE | Facility: CLINIC | Age: 42
End: 2022-01-11
Payer: COMMERCIAL

## 2022-01-11 VITALS
DIASTOLIC BLOOD PRESSURE: 82 MMHG | SYSTOLIC BLOOD PRESSURE: 140 MMHG | HEART RATE: 91 BPM | HEIGHT: 74 IN | WEIGHT: 247.13 LBS | OXYGEN SATURATION: 99 % | BODY MASS INDEX: 31.72 KG/M2

## 2022-01-11 DIAGNOSIS — E78.2 MIXED DYSLIPIDEMIA: ICD-10-CM

## 2022-01-11 DIAGNOSIS — Z00.00 ANNUAL PHYSICAL EXAM: Primary | ICD-10-CM

## 2022-01-11 DIAGNOSIS — I10 ESSENTIAL HYPERTENSION: ICD-10-CM

## 2022-01-11 DIAGNOSIS — R80.9 CONTROLLED TYPE 2 DIABETES MELLITUS WITH MICROALBUMINURIA, WITHOUT LONG-TERM CURRENT USE OF INSULIN: ICD-10-CM

## 2022-01-11 DIAGNOSIS — E11.29 CONTROLLED TYPE 2 DIABETES MELLITUS WITH MICROALBUMINURIA, WITHOUT LONG-TERM CURRENT USE OF INSULIN: ICD-10-CM

## 2022-01-11 PROBLEM — E66.811 OBESITY (BMI 30.0-34.9): Status: ACTIVE | Noted: 2017-07-13

## 2022-01-11 PROBLEM — E66.9 OBESITY (BMI 30.0-34.9): Status: ACTIVE | Noted: 2017-07-13

## 2022-01-11 PROCEDURE — 3008F BODY MASS INDEX DOCD: CPT | Mod: CPTII,S$GLB,, | Performed by: PHYSICIAN ASSISTANT

## 2022-01-11 PROCEDURE — 3077F PR MOST RECENT SYSTOLIC BLOOD PRESSURE >= 140 MM HG: ICD-10-PCS | Mod: CPTII,S$GLB,, | Performed by: PHYSICIAN ASSISTANT

## 2022-01-11 PROCEDURE — 1160F RVW MEDS BY RX/DR IN RCRD: CPT | Mod: CPTII,S$GLB,, | Performed by: PHYSICIAN ASSISTANT

## 2022-01-11 PROCEDURE — 3072F PR LOW RISK FOR RETINOPATHY: ICD-10-PCS | Mod: CPTII,S$GLB,, | Performed by: PHYSICIAN ASSISTANT

## 2022-01-11 PROCEDURE — 99396 PREV VISIT EST AGE 40-64: CPT | Mod: S$GLB,,, | Performed by: PHYSICIAN ASSISTANT

## 2022-01-11 PROCEDURE — 99999 PR PBB SHADOW E&M-EST. PATIENT-LVL V: CPT | Mod: PBBFAC,,, | Performed by: PHYSICIAN ASSISTANT

## 2022-01-11 PROCEDURE — 3072F LOW RISK FOR RETINOPATHY: CPT | Mod: CPTII,S$GLB,, | Performed by: PHYSICIAN ASSISTANT

## 2022-01-11 PROCEDURE — 99999 PR PBB SHADOW E&M-EST. PATIENT-LVL V: ICD-10-PCS | Mod: PBBFAC,,, | Performed by: PHYSICIAN ASSISTANT

## 2022-01-11 PROCEDURE — 3044F PR MOST RECENT HEMOGLOBIN A1C LEVEL <7.0%: ICD-10-PCS | Mod: CPTII,S$GLB,, | Performed by: PHYSICIAN ASSISTANT

## 2022-01-11 PROCEDURE — 1160F PR REVIEW ALL MEDS BY PRESCRIBER/CLIN PHARMACIST DOCUMENTED: ICD-10-PCS | Mod: CPTII,S$GLB,, | Performed by: PHYSICIAN ASSISTANT

## 2022-01-11 PROCEDURE — 3079F DIAST BP 80-89 MM HG: CPT | Mod: CPTII,S$GLB,, | Performed by: PHYSICIAN ASSISTANT

## 2022-01-11 PROCEDURE — 3077F SYST BP >= 140 MM HG: CPT | Mod: CPTII,S$GLB,, | Performed by: PHYSICIAN ASSISTANT

## 2022-01-11 PROCEDURE — 3044F HG A1C LEVEL LT 7.0%: CPT | Mod: CPTII,S$GLB,, | Performed by: PHYSICIAN ASSISTANT

## 2022-01-11 PROCEDURE — 99396 PR PREVENTIVE VISIT,EST,40-64: ICD-10-PCS | Mod: S$GLB,,, | Performed by: PHYSICIAN ASSISTANT

## 2022-01-11 PROCEDURE — 1159F MED LIST DOCD IN RCRD: CPT | Mod: CPTII,S$GLB,, | Performed by: PHYSICIAN ASSISTANT

## 2022-01-11 PROCEDURE — 3008F PR BODY MASS INDEX (BMI) DOCUMENTED: ICD-10-PCS | Mod: CPTII,S$GLB,, | Performed by: PHYSICIAN ASSISTANT

## 2022-01-11 PROCEDURE — 1159F PR MEDICATION LIST DOCUMENTED IN MEDICAL RECORD: ICD-10-PCS | Mod: CPTII,S$GLB,, | Performed by: PHYSICIAN ASSISTANT

## 2022-01-11 PROCEDURE — 3079F PR MOST RECENT DIASTOLIC BLOOD PRESSURE 80-89 MM HG: ICD-10-PCS | Mod: CPTII,S$GLB,, | Performed by: PHYSICIAN ASSISTANT

## 2022-01-11 NOTE — PROGRESS NOTES
Subjective:       Patient ID: Claudio Herr is a 41 y.o. male with PMH of HTN, HLD and DM.     Chief Complaint: Annual Exam    HPI     Established pt of Naeem Chamorro MD (Inactive)     Here for annual exam.    He is doing well. No acute complaints today.     Previsit lab reviewed. His A1c is great at 5.3% on Trulicity. He has lost about 30lbs over the past year intentional with diet and lifestyle changes. Feels Trulicity helped decrease his appetite. BP slight elevatedtoday, just took meds prior to arrival, otherwise typically well controlled.     Past Medical History:   Diagnosis Date    Diabetes mellitus type II     Hyperlipidemia     Hypertension     Vitamin D deficiency disease        Social History     Tobacco Use    Smoking status: Current Every Day Smoker     Packs/day: 1.00     Years: 8.00     Pack years: 8.00     Types: Cigarettes    Smokeless tobacco: Never Used   Substance Use Topics    Alcohol use: Yes     Comment: occasional    Drug use: No     Review of patient's allergies indicates:   Allergen Reactions    No known drug allergies        Current Outpatient Medications:     amLODIPine (NORVASC) 5 MG tablet, Take 1 tablet (5 mg total) by mouth once daily., Disp: 90 tablet, Rfl: 3    atorvastatin (LIPITOR) 40 MG tablet, Take 1 tablet (40 mg total) by mouth once daily., Disp: 90 tablet, Rfl: 3    blood sugar diagnostic Strp, 1 strip by Misc.(Non-Drug; Combo Route) route once daily., Disp: 100 strip, Rfl: 11    hydroCHLOROthiazide (HYDRODIURIL) 25 MG tablet, Take 1 tablet (25 mg total) by mouth once daily., Disp: 90 tablet, Rfl: 0    lancets Misc, 1 lancet by Misc.(Non-Drug; Combo Route) route once daily., Disp: 100 each, Rfl: 11    losartan (COZAAR) 100 MG tablet, Take 1 tablet by mouth once daily, Disp: 30 tablet, Rfl: 6    TRULICITY 1.5 mg/0.5 mL pen injector, INJECT 1.5 MG SUBCUTANEOUSLY EVERY 7 DAYS, Disp: 4 pen, Rfl: 3    blood-glucose meter kit, Use as instructed, Disp: 1 each,  "Rfl: 0    lancing device Misc, 1 Device by Misc.(Non-Drug; Combo Route) route once daily., Disp: 1 each, Rfl: 0      Review of Systems   Constitutional: Negative for chills, fever and unexpected weight change.   Eyes: Negative for visual disturbance.   Respiratory: Negative for cough, shortness of breath and wheezing.    Cardiovascular: Negative for chest pain and leg swelling.   Gastrointestinal: Negative for abdominal pain, nausea and vomiting.   Endocrine: Negative for polydipsia, polyphagia and polyuria.   Integumentary:  Negative for rash.   Neurological: Negative for weakness, light-headedness and headaches.         Objective: BP (!) 140/82   Pulse 91   Ht 6' 2" (1.88 m)   Wt 112.1 kg (247 lb 2.2 oz)   SpO2 99%   BMI 31.73 kg/m²         Physical Exam  Vitals reviewed.   Constitutional:       General: He is not in acute distress.     Appearance: He is well-developed.   HENT:      Head: Normocephalic and atraumatic.   Eyes:      Pupils: Pupils are equal, round, and reactive to light.   Cardiovascular:      Rate and Rhythm: Normal rate and regular rhythm.      Heart sounds: No murmur heard.  No friction rub.   Pulmonary:      Effort: Pulmonary effort is normal.      Breath sounds: Normal breath sounds. No wheezing or rales.   Abdominal:      General: Bowel sounds are normal.      Palpations: Abdomen is soft.      Tenderness: There is no abdominal tenderness.   Musculoskeletal:         General: Normal range of motion.   Skin:     General: Skin is warm and dry.      Capillary Refill: Capillary refill takes less than 2 seconds.      Findings: No rash.   Neurological:      Mental Status: He is alert and oriented to person, place, and time.         Assessment:       Problem List Items Addressed This Visit        Cardiac/Vascular    Mixed dyslipidemia      Other Visit Diagnoses     Annual physical exam    -  Primary    Essential hypertension        Diabetes mellitus without complication        Relevant Orders    " Ambulatory referral/consult to Optometry    Ambulatory referral/consult to Podiatry    Hemoglobin A1C          Plan:         Claudio was seen today for annual exam.    Diagnoses and all orders for this visit:    Annual physical exam  HM reviewed and updated  Pt to schedule EYE exam    Essential hypertension  Slight elevated today  Advised to monitor  Continue current meds    Mixed dyslipidemia  Improved  Continue statin therapy    Controlled type 2 diabetes mellitus with microalbuminuria, without long-term current use of insulin  Lab Results   Component Value Date    HGBA1C 5.3 01/05/2022   Applauded on lifestyle mods, successful weight lost  Continue current meds for now  Plans to repeat A1c in 6 months, may consider discontinue Trulicity  -     Ambulatory referral/consult to Optometry; Future  -     Ambulatory referral/consult to Podiatry; Future  -     Hemoglobin A1C; Future          Cassidy Santana PA-C

## 2022-01-12 ENCOUNTER — IMMUNIZATION (OUTPATIENT)
Dept: PHARMACY | Facility: CLINIC | Age: 42
End: 2022-01-12
Payer: COMMERCIAL

## 2022-01-12 DIAGNOSIS — Z23 NEED FOR VACCINATION: Primary | ICD-10-CM

## 2022-01-25 RX ORDER — DULAGLUTIDE 1.5 MG/.5ML
INJECTION, SOLUTION SUBCUTANEOUS
Qty: 4 PEN | Refills: 3 | OUTPATIENT
Start: 2022-01-25

## 2022-01-25 RX ORDER — HYDROCHLOROTHIAZIDE 25 MG/1
25 TABLET ORAL DAILY
Qty: 90 TABLET | Refills: 0 | OUTPATIENT
Start: 2022-01-25

## 2022-01-25 RX ORDER — ATORVASTATIN CALCIUM 40 MG/1
40 TABLET, FILM COATED ORAL DAILY
Qty: 90 TABLET | Refills: 3 | OUTPATIENT
Start: 2022-01-25

## 2022-01-25 RX ORDER — AMLODIPINE BESYLATE 5 MG/1
5 TABLET ORAL DAILY
Qty: 90 TABLET | Refills: 3 | OUTPATIENT
Start: 2022-01-25 | End: 2023-01-25

## 2022-01-25 RX ORDER — ERGOCALCIFEROL 1.25 MG/1
CAPSULE ORAL
Qty: 4 CAPSULE | Refills: 0 | OUTPATIENT
Start: 2022-01-25

## 2022-01-25 RX ORDER — LOSARTAN POTASSIUM 100 MG/1
100 TABLET ORAL DAILY
Qty: 30 TABLET | Refills: 6 | OUTPATIENT
Start: 2022-01-25

## 2022-01-26 NOTE — TELEPHONE ENCOUNTER
Provider Staff:     Action required for this patient.    Please note Refusal of medication.            Requested Prescriptions     Refused Prescriptions Disp Refills    amLODIPine (NORVASC) 5 MG tablet 90 tablet 3     Sig: Take 1 tablet (5 mg total) by mouth once daily.     Refused By: EVAN MARQUEZ     Reason for Refusal: Patient needs an appointment    atorvastatin (LIPITOR) 40 MG tablet 90 tablet 3     Sig: Take 1 tablet (40 mg total) by mouth once daily.     Refused By: EVAN MARQUEZ     Reason for Refusal: Patient needs an appointment    ergocalciferol (ERGOCALCIFEROL) 50,000 unit Cap 4 capsule 0     Sig: TAKE 1 CAPSULE BY MOUTH ONCE A WEEK FOR  VITAMIN  D  DEFICIENCY     Refused By: EVAN MARQUEZ     Reason for Refusal: Patient needs an appointment    hydroCHLOROthiazide (HYDRODIURIL) 25 MG tablet 90 tablet 0     Sig: Take 1 tablet (25 mg total) by mouth once daily.     Refused By: EVAN MARQUEZ     Reason for Refusal: Patient needs an appointment    losartan (COZAAR) 100 MG tablet 30 tablet 6     Sig: Take 1 tablet (100 mg total) by mouth once daily.     Refused By: EVAN MARQUEZ     Reason for Refusal: Patient needs an appointment    dulaglutide (TRULICITY) 1.5 mg/0.5 mL pen injector 4 pen 3     Sig: INJECT 1.5MG  SUBCUTANEOUSLY EVERY 7 DAYS     Refused By: EVAN MARQUEZ     Reason for Refusal: Patient needs an appointment      Don  [facility]   Note composed: 01/26/2022 12:13 PM       Provider Staff:     Action required for this patient.    Please note Refusal of medication.            Requested Prescriptions     Refused Prescriptions Disp Refills    amLODIPine (NORVASC) 5 MG tablet 90 tablet 3     Sig: Take 1 tablet (5 mg total) by mouth once daily.     Refused By: EVAN MARQUEZ     Reason for Refusal: Patient needs an appointment    atorvastatin (LIPITOR) 40 MG tablet 90 tablet 3     Sig: Take 1 tablet (40 mg total) by mouth once daily.     Refused  By: EVAN MARQUEZ     Reason for Refusal: Patient needs an appointment    ergocalciferol (ERGOCALCIFEROL) 50,000 unit Cap 4 capsule 0     Sig: TAKE 1 CAPSULE BY MOUTH ONCE A WEEK FOR  VITAMIN  D  DEFICIENCY     Refused By: EVAN MARQUEZ     Reason for Refusal: Patient needs an appointment    hydroCHLOROthiazide (HYDRODIURIL) 25 MG tablet 90 tablet 0     Sig: Take 1 tablet (25 mg total) by mouth once daily.     Refused By: EVAN MARQUEZ     Reason for Refusal: Patient needs an appointment    losartan (COZAAR) 100 MG tablet 30 tablet 6     Sig: Take 1 tablet (100 mg total) by mouth once daily.     Refused By: EVAN MARQUEZ     Reason for Refusal: Patient needs an appointment    dulaglutide (TRULICITY) 1.5 mg/0.5 mL pen injector 4 pen 3     Sig: INJECT 1.5MG  SUBCUTANEOUSLY EVERY 7 DAYS     Refused By: EVAN MARQUEZ     Reason for Refusal: Patient needs an appointment      Thanks!  [facility]   Note composed: 01/26/2022 12:13 PM

## 2022-02-21 ENCOUNTER — TELEPHONE (OUTPATIENT)
Dept: INTERNAL MEDICINE | Facility: CLINIC | Age: 42
End: 2022-02-21
Payer: COMMERCIAL

## 2022-02-21 NOTE — TELEPHONE ENCOUNTER
----- Message from Eden Aguilar sent at 2/21/2022  3:26 PM CST -----  Contact: [phone] or [phone]  Pt's wife called to adviser the RX forTRULICITY 1.5 mg/0.5 mL pen injector needs a PA. Please Advise

## 2022-02-22 ENCOUNTER — TELEPHONE (OUTPATIENT)
Dept: INTERNAL MEDICINE | Facility: CLINIC | Age: 42
End: 2022-02-22
Payer: COMMERCIAL

## 2022-02-22 NOTE — TELEPHONE ENCOUNTER
PA for  TRULICITY INJ 1.5/0.5 is approved through 02/22/2023. Your patient may now fill this prescription and it will be covered.                        Claudio Herr Key: [id number] - PA Case ID: [id number] help? Call us at [phone]  Outcome  Approvedtoday  Request Reference Number: [id number]. TRULICITY INJ 1.5/0.5 is approved through 02/22/2023. Your patient may now fill this prescription and it will be covered.  Drug  Trulicity 1.5MG/0.5ML pen-injectors  Form  [employer] Electronic Prior Authorization Form (2017 NCPDP)

## 2022-02-22 NOTE — TELEPHONE ENCOUNTER
PA for Trulicity 1.5MG/0.5ML pen-injectors done at this time  Decision to be sent via fax  See copied response below from [employer]      Claudio Herr Key: [id number] - PA Case ID: [id number] help? Call us at [phone]  Status  Sent to [employer]  Drug  Trulicity 1.5MG/0.5ML pen-injectors  Form  [employer] Electronic Prior Authorization Form (2017 NCPDP)

## 2022-03-23 DIAGNOSIS — E78.2 MIXED DYSLIPIDEMIA: ICD-10-CM

## 2022-03-23 DIAGNOSIS — I10 HTN (HYPERTENSION), BENIGN: ICD-10-CM

## 2022-03-23 DIAGNOSIS — I10 ESSENTIAL HYPERTENSION: ICD-10-CM

## 2022-03-23 DIAGNOSIS — R80.9 TYPE 2 DIABETES MELLITUS WITH MICROALBUMINURIA, WITHOUT LONG-TERM CURRENT USE OF INSULIN: ICD-10-CM

## 2022-03-23 DIAGNOSIS — E11.29 TYPE 2 DIABETES MELLITUS WITH MICROALBUMINURIA, WITHOUT LONG-TERM CURRENT USE OF INSULIN: ICD-10-CM

## 2022-03-23 RX ORDER — LOSARTAN POTASSIUM 100 MG/1
100 TABLET ORAL DAILY
Qty: 90 TABLET | Refills: 2 | Status: SHIPPED | OUTPATIENT
Start: 2022-03-23 | End: 2023-03-08 | Stop reason: SDUPTHER

## 2022-03-23 RX ORDER — ATORVASTATIN CALCIUM 40 MG/1
TABLET, FILM COATED ORAL
Qty: 90 TABLET | Refills: 2 | OUTPATIENT
Start: 2022-03-23

## 2022-03-23 RX ORDER — AMLODIPINE BESYLATE 5 MG/1
TABLET ORAL
Qty: 90 TABLET | Refills: 2 | OUTPATIENT
Start: 2022-03-23

## 2022-03-23 RX ORDER — AMLODIPINE BESYLATE 5 MG/1
5 TABLET ORAL DAILY
Qty: 90 TABLET | Refills: 3 | Status: SHIPPED | OUTPATIENT
Start: 2022-03-23 | End: 2023-03-08 | Stop reason: SDUPTHER

## 2022-03-23 RX ORDER — DULAGLUTIDE 1.5 MG/.5ML
INJECTION, SOLUTION SUBCUTANEOUS
Qty: 4 PEN | Refills: 3 | Status: SHIPPED | OUTPATIENT
Start: 2022-03-23 | End: 2022-08-19

## 2022-03-23 RX ORDER — ATORVASTATIN CALCIUM 40 MG/1
40 TABLET, FILM COATED ORAL DAILY
Qty: 90 TABLET | Refills: 3 | Status: SHIPPED | OUTPATIENT
Start: 2022-03-23 | End: 2023-03-08 | Stop reason: SDUPTHER

## 2022-03-23 NOTE — TELEPHONE ENCOUNTER
----- Message from Neela Alcantara sent at 3/23/2022 10:57 AM CDT -----  Contact: Isaura/Marquis/[phone]  Requesting an RX refill or new RX.  Is this a refill or new RX: refill 1  RX name and strength (copy/paste from chart):  atorvastatin (LIPITOR) 40 MG tablet  Is this a 30 day or 90 day RX:   Pharmacy name and phone # (copy/paste from chart): [address]  Phone:  [phone]  Fax:  [phone]      Requesting an RX refill or new RX.  Is this a refill or new RX: refill 2  RX name and strength (copy/paste from chart):  amLODIPine (NORVASC) 5 MG tablet  Is this a 30 day or 90 day RX:   Pharmacy name and phone # (copy/paste from chart): [address]  Phone:  [phone]  Fax:  [phone]      Requesting an RX refill or new RX.  Is this a refill or new RX: refill 3  RX name and strength (copy/paste from chart):  TRULICITY 1.5 mg/0.5 mL pen injector  Is this a 30 day or 90 day RX:   Pharmacy name and phone # (copy/paste from chart): [address]  Phone:  [phone]  Fax:  [phone]    Requesting an RX refill or new RX.  Is this a refill or new RX: refill 4  RX name and strength (copy/paste from chart):  losartan (COZAAR) 100 MG tablet  Is this a 30 day or 90 day RX:   Pharmacy name and phone # (copy/paste from chart): [address]  Phone:  [phone]  Fax:  [phone]           The doctors have asked that we provide their patients with the following 2 reminders -- prescription refills can take up to 72 hours, and a friendly reminder that in the future you can use your [facility] account to request refills:

## 2022-09-16 DIAGNOSIS — E11.29 TYPE 2 DIABETES MELLITUS WITH MICROALBUMINURIA, WITHOUT LONG-TERM CURRENT USE OF INSULIN: ICD-10-CM

## 2022-09-16 DIAGNOSIS — R80.9 TYPE 2 DIABETES MELLITUS WITH MICROALBUMINURIA, WITHOUT LONG-TERM CURRENT USE OF INSULIN: ICD-10-CM

## 2022-09-16 RX ORDER — DULAGLUTIDE 1.5 MG/.5ML
INJECTION, SOLUTION SUBCUTANEOUS
Qty: 4 PEN | Refills: 3 | Status: SHIPPED | OUTPATIENT
Start: 2022-09-16 | End: 2023-03-08 | Stop reason: SDUPTHER

## 2022-12-21 ENCOUNTER — OCCUPATIONAL HEALTH (OUTPATIENT)
Dept: URGENT CARE | Facility: CLINIC | Age: 42
End: 2022-12-21

## 2022-12-21 DIAGNOSIS — Z13.9 ENCOUNTER FOR SCREENING: Primary | ICD-10-CM

## 2022-12-21 PROCEDURE — 90686 IIV4 VACC NO PRSV 0.5 ML IM: CPT | Mod: S$GLB,,, | Performed by: EMERGENCY MEDICINE

## 2022-12-21 PROCEDURE — 86580 TB INTRADERMAL TEST: CPT | Mod: S$GLB,,, | Performed by: EMERGENCY MEDICINE

## 2022-12-21 PROCEDURE — 86580 POCT TB SKIN TEST: ICD-10-PCS | Mod: S$GLB,,, | Performed by: EMERGENCY MEDICINE

## 2022-12-21 PROCEDURE — 90686 FLU VACCINE (QUAD) GREATER THAN OR EQUAL TO 3YO PRESERVATIVE FREE IM: ICD-10-PCS | Mod: S$GLB,,, | Performed by: EMERGENCY MEDICINE

## 2022-12-23 LAB
TB INDURATION - 48 HR READ: 0 MM
TB INDURATION - 72 HR READ: 0 MM
TB SKIN TEST - 48 HR READ: NEGATIVE
TB SKIN TEST - 72 HR READ: NEGATIVE

## 2023-01-11 NOTE — TELEPHONE ENCOUNTER
Tried to call pt. No answer. Left message to return call      normal gait and station , no tenderness or deformities present

## 2023-03-09 ENCOUNTER — PATIENT MESSAGE (OUTPATIENT)
Dept: INTERNAL MEDICINE | Facility: CLINIC | Age: 43
End: 2023-03-09
Payer: COMMERCIAL

## 2023-06-22 ENCOUNTER — TELEPHONE (OUTPATIENT)
Dept: INTERNAL MEDICINE | Facility: CLINIC | Age: 43
End: 2023-06-22
Payer: COMMERCIAL

## 2023-06-22 DIAGNOSIS — E11.29 CONTROLLED TYPE 2 DIABETES MELLITUS WITH MICROALBUMINURIA, WITHOUT LONG-TERM CURRENT USE OF INSULIN: ICD-10-CM

## 2023-06-22 DIAGNOSIS — R80.9 CONTROLLED TYPE 2 DIABETES MELLITUS WITH MICROALBUMINURIA, WITHOUT LONG-TERM CURRENT USE OF INSULIN: ICD-10-CM

## 2023-06-22 DIAGNOSIS — I10 ESSENTIAL HYPERTENSION: Primary | ICD-10-CM

## 2023-06-22 NOTE — TELEPHONE ENCOUNTER
Called pt; pt did not answer    Left message stating lab orders have been placed for physical and are ready to be scheduled

## 2023-06-22 NOTE — TELEPHONE ENCOUNTER
"----- Message from Dipikajuan c Elkins sent at 6/22/2023  7:47 AM CDT -----  Contact: Mobile [phone]  type: Lab    Caller is requesting to schedule their Lab appointment prior to annual appointment.  Order is not listed in EPIC.  Please enter order and contact patient to schedule.    Name of Caller: Patient     Preferred Date and Time of Labs: N/A    Date of Annual Physical Appointment: 07/05/2023.    Where would they like the lab performed? N/A    Would the patient rather a call back or a response via [facility]? Call    Best Call Back Number: Mobile [phone]    Additional Information: N/A    "Do not send messages requesting lab orders prior to Physical appt on these providers: Earle Donovan, Demario Gonzalez,  Jose Tomas and Rubén."       "

## 2023-06-22 NOTE — TELEPHONE ENCOUNTER
Orders Placed This Encounter   Procedures    HEMOGLOBIN A1C    LIPID PANEL    COMPREHENSIVE METABOLIC PANEL    CBC W/ AUTO DIFFERENTIAL    TSH

## 2023-07-05 ENCOUNTER — OFFICE VISIT (OUTPATIENT)
Dept: INTERNAL MEDICINE | Facility: CLINIC | Age: 43
End: 2023-07-05
Payer: COMMERCIAL

## 2023-07-05 VITALS
BODY MASS INDEX: 30.16 KG/M2 | HEIGHT: 74 IN | SYSTOLIC BLOOD PRESSURE: 152 MMHG | OXYGEN SATURATION: 98 % | HEART RATE: 85 BPM | WEIGHT: 235 LBS | DIASTOLIC BLOOD PRESSURE: 82 MMHG

## 2023-07-05 DIAGNOSIS — E11.29 TYPE 2 DIABETES MELLITUS WITH MICROALBUMINURIA, WITHOUT LONG-TERM CURRENT USE OF INSULIN: ICD-10-CM

## 2023-07-05 DIAGNOSIS — R01.1 CARDIAC MURMUR: ICD-10-CM

## 2023-07-05 DIAGNOSIS — I10 HTN (HYPERTENSION), BENIGN: ICD-10-CM

## 2023-07-05 DIAGNOSIS — I10 ESSENTIAL HYPERTENSION: ICD-10-CM

## 2023-07-05 DIAGNOSIS — Z00.00 ANNUAL PHYSICAL EXAM: Primary | ICD-10-CM

## 2023-07-05 DIAGNOSIS — E78.2 MIXED DYSLIPIDEMIA: ICD-10-CM

## 2023-07-05 DIAGNOSIS — F17.200 CURRENT SMOKER: ICD-10-CM

## 2023-07-05 DIAGNOSIS — R80.9 TYPE 2 DIABETES MELLITUS WITH MICROALBUMINURIA, WITHOUT LONG-TERM CURRENT USE OF INSULIN: ICD-10-CM

## 2023-07-05 PROCEDURE — 1159F PR MEDICATION LIST DOCUMENTED IN MEDICAL RECORD: ICD-10-PCS | Mod: CPTII,S$GLB,, | Performed by: PHYSICIAN ASSISTANT

## 2023-07-05 PROCEDURE — 1160F RVW MEDS BY RX/DR IN RCRD: CPT | Mod: CPTII,S$GLB,, | Performed by: PHYSICIAN ASSISTANT

## 2023-07-05 PROCEDURE — 1160F PR REVIEW ALL MEDS BY PRESCRIBER/CLIN PHARMACIST DOCUMENTED: ICD-10-PCS | Mod: CPTII,S$GLB,, | Performed by: PHYSICIAN ASSISTANT

## 2023-07-05 PROCEDURE — 3044F PR MOST RECENT HEMOGLOBIN A1C LEVEL <7.0%: ICD-10-PCS | Mod: CPTII,S$GLB,, | Performed by: PHYSICIAN ASSISTANT

## 2023-07-05 PROCEDURE — 3008F PR BODY MASS INDEX (BMI) DOCUMENTED: ICD-10-PCS | Mod: CPTII,S$GLB,, | Performed by: PHYSICIAN ASSISTANT

## 2023-07-05 PROCEDURE — 3079F PR MOST RECENT DIASTOLIC BLOOD PRESSURE 80-89 MM HG: ICD-10-PCS | Mod: CPTII,S$GLB,, | Performed by: PHYSICIAN ASSISTANT

## 2023-07-05 PROCEDURE — 3008F BODY MASS INDEX DOCD: CPT | Mod: CPTII,S$GLB,, | Performed by: PHYSICIAN ASSISTANT

## 2023-07-05 PROCEDURE — 4010F PR ACE/ARB THEARPY RXD/TAKEN: ICD-10-PCS | Mod: CPTII,S$GLB,, | Performed by: PHYSICIAN ASSISTANT

## 2023-07-05 PROCEDURE — 99396 PR PREVENTIVE VISIT,EST,40-64: ICD-10-PCS | Mod: S$GLB,,, | Performed by: PHYSICIAN ASSISTANT

## 2023-07-05 PROCEDURE — 99999 PR PBB SHADOW E&M-EST. PATIENT-LVL IV: CPT | Mod: PBBFAC,,, | Performed by: PHYSICIAN ASSISTANT

## 2023-07-05 PROCEDURE — 99999 PR PBB SHADOW E&M-EST. PATIENT-LVL IV: ICD-10-PCS | Mod: PBBFAC,,, | Performed by: PHYSICIAN ASSISTANT

## 2023-07-05 PROCEDURE — 4010F ACE/ARB THERAPY RXD/TAKEN: CPT | Mod: CPTII,S$GLB,, | Performed by: PHYSICIAN ASSISTANT

## 2023-07-05 PROCEDURE — 3077F SYST BP >= 140 MM HG: CPT | Mod: CPTII,S$GLB,, | Performed by: PHYSICIAN ASSISTANT

## 2023-07-05 PROCEDURE — 3079F DIAST BP 80-89 MM HG: CPT | Mod: CPTII,S$GLB,, | Performed by: PHYSICIAN ASSISTANT

## 2023-07-05 PROCEDURE — 99396 PREV VISIT EST AGE 40-64: CPT | Mod: S$GLB,,, | Performed by: PHYSICIAN ASSISTANT

## 2023-07-05 PROCEDURE — 1159F MED LIST DOCD IN RCRD: CPT | Mod: CPTII,S$GLB,, | Performed by: PHYSICIAN ASSISTANT

## 2023-07-05 PROCEDURE — 3077F PR MOST RECENT SYSTOLIC BLOOD PRESSURE >= 140 MM HG: ICD-10-PCS | Mod: CPTII,S$GLB,, | Performed by: PHYSICIAN ASSISTANT

## 2023-07-05 PROCEDURE — 3044F HG A1C LEVEL LT 7.0%: CPT | Mod: CPTII,S$GLB,, | Performed by: PHYSICIAN ASSISTANT

## 2023-07-05 RX ORDER — ATORVASTATIN CALCIUM 40 MG/1
40 TABLET, FILM COATED ORAL DAILY
Qty: 90 TABLET | Refills: 3 | Status: SHIPPED | OUTPATIENT
Start: 2023-07-05

## 2023-07-05 RX ORDER — AMLODIPINE BESYLATE 5 MG/1
5 TABLET ORAL DAILY
Qty: 90 TABLET | Refills: 3 | Status: SHIPPED | OUTPATIENT
Start: 2023-07-05 | End: 2024-07-04

## 2023-07-05 RX ORDER — DULAGLUTIDE 1.5 MG/.5ML
INJECTION, SOLUTION SUBCUTANEOUS
Qty: 4 PEN | Refills: 3 | Status: SHIPPED | OUTPATIENT
Start: 2023-07-05 | End: 2023-11-20

## 2023-07-05 RX ORDER — LOSARTAN POTASSIUM 100 MG/1
100 TABLET ORAL DAILY
Qty: 90 TABLET | Refills: 3 | Status: SHIPPED | OUTPATIENT
Start: 2023-07-05

## 2023-07-05 RX ORDER — HYDROCHLOROTHIAZIDE 25 MG/1
25 TABLET ORAL DAILY
Qty: 90 TABLET | Refills: 3 | Status: SHIPPED | OUTPATIENT
Start: 2023-07-05

## 2023-07-06 ENCOUNTER — LAB VISIT (OUTPATIENT)
Dept: LAB | Facility: HOSPITAL | Age: 43
End: 2023-07-06
Payer: COMMERCIAL

## 2023-07-06 DIAGNOSIS — E11.29 CONTROLLED TYPE 2 DIABETES MELLITUS WITH MICROALBUMINURIA, WITHOUT LONG-TERM CURRENT USE OF INSULIN: ICD-10-CM

## 2023-07-06 DIAGNOSIS — D64.9 ANEMIA, UNSPECIFIED TYPE: Primary | ICD-10-CM

## 2023-07-06 DIAGNOSIS — I10 ESSENTIAL HYPERTENSION: ICD-10-CM

## 2023-07-06 DIAGNOSIS — Z00.00 ANNUAL PHYSICAL EXAM: ICD-10-CM

## 2023-07-06 DIAGNOSIS — R80.9 CONTROLLED TYPE 2 DIABETES MELLITUS WITH MICROALBUMINURIA, WITHOUT LONG-TERM CURRENT USE OF INSULIN: ICD-10-CM

## 2023-07-06 LAB
ALBUMIN SERPL BCP-MCNC: 3.5 G/DL (ref 3.5–5.2)
ALP SERPL-CCNC: 63 U/L (ref 55–135)
ALT SERPL W/O P-5'-P-CCNC: 24 U/L (ref 10–44)
ANION GAP SERPL CALC-SCNC: 6 MMOL/L (ref 8–16)
AST SERPL-CCNC: 25 U/L (ref 10–40)
BASOPHILS # BLD AUTO: 0.04 K/UL (ref 0–0.2)
BASOPHILS NFR BLD: 0.5 % (ref 0–1.9)
BILIRUB SERPL-MCNC: 0.3 MG/DL (ref 0.1–1)
BUN SERPL-MCNC: 18 MG/DL (ref 6–20)
CALCIUM SERPL-MCNC: 9 MG/DL (ref 8.7–10.5)
CHLORIDE SERPL-SCNC: 107 MMOL/L (ref 95–110)
CHOLEST SERPL-MCNC: 166 MG/DL (ref 120–199)
CHOLEST/HDLC SERPL: 3.8 {RATIO} (ref 2–5)
CO2 SERPL-SCNC: 29 MMOL/L (ref 23–29)
CREAT SERPL-MCNC: 1 MG/DL (ref 0.5–1.4)
DIFFERENTIAL METHOD: ABNORMAL
EOSINOPHIL # BLD AUTO: 0.1 K/UL (ref 0–0.5)
EOSINOPHIL NFR BLD: 1.5 % (ref 0–8)
ERYTHROCYTE [DISTWIDTH] IN BLOOD BY AUTOMATED COUNT: 14.6 % (ref 11.5–14.5)
EST. GFR  (NO RACE VARIABLE): >60 ML/MIN/1.73 M^2
ESTIMATED AVG GLUCOSE: 97 MG/DL (ref 68–131)
FERRITIN SERPL-MCNC: 89 NG/ML (ref 20–300)
GLUCOSE SERPL-MCNC: 78 MG/DL (ref 70–110)
HBA1C MFR BLD: 5 % (ref 4–5.6)
HCT VFR BLD AUTO: 38.9 % (ref 40–54)
HCV AB SERPL QL IA: NORMAL
HDLC SERPL-MCNC: 44 MG/DL (ref 40–75)
HDLC SERPL: 26.5 % (ref 20–50)
HGB BLD-MCNC: 12.6 G/DL (ref 14–18)
IMM GRANULOCYTES # BLD AUTO: 0.02 K/UL (ref 0–0.04)
IMM GRANULOCYTES NFR BLD AUTO: 0.2 % (ref 0–0.5)
IRON SERPL-MCNC: 64 UG/DL (ref 45–160)
LDLC SERPL CALC-MCNC: 111 MG/DL (ref 63–159)
LYMPHOCYTES # BLD AUTO: 1.9 K/UL (ref 1–4.8)
LYMPHOCYTES NFR BLD: 21.7 % (ref 18–48)
MCH RBC QN AUTO: 31.3 PG (ref 27–31)
MCHC RBC AUTO-ENTMCNC: 32.4 G/DL (ref 32–36)
MCV RBC AUTO: 97 FL (ref 82–98)
MONOCYTES # BLD AUTO: 0.6 K/UL (ref 0.3–1)
MONOCYTES NFR BLD: 7.4 % (ref 4–15)
NEUTROPHILS # BLD AUTO: 5.9 K/UL (ref 1.8–7.7)
NEUTROPHILS NFR BLD: 68.7 % (ref 38–73)
NONHDLC SERPL-MCNC: 122 MG/DL
NRBC BLD-RTO: 0 /100 WBC
PLATELET # BLD AUTO: 236 K/UL (ref 150–450)
PMV BLD AUTO: 10.8 FL (ref 9.2–12.9)
POTASSIUM SERPL-SCNC: 4 MMOL/L (ref 3.5–5.1)
PROT SERPL-MCNC: 6.6 G/DL (ref 6–8.4)
RBC # BLD AUTO: 4.02 M/UL (ref 4.6–6.2)
SATURATED IRON: 22 % (ref 20–50)
SODIUM SERPL-SCNC: 142 MMOL/L (ref 136–145)
TOTAL IRON BINDING CAPACITY: 290 UG/DL (ref 250–450)
TRANSFERRIN SERPL-MCNC: 196 MG/DL (ref 200–375)
TRIGL SERPL-MCNC: 55 MG/DL (ref 30–150)
TSH SERPL DL<=0.005 MIU/L-ACNC: 3.38 UIU/ML (ref 0.4–4)
WBC # BLD AUTO: 8.6 K/UL (ref 3.9–12.7)

## 2023-07-06 PROCEDURE — 83036 HEMOGLOBIN GLYCOSYLATED A1C: CPT | Performed by: PHYSICIAN ASSISTANT

## 2023-07-06 PROCEDURE — 80061 LIPID PANEL: CPT | Performed by: PHYSICIAN ASSISTANT

## 2023-07-06 PROCEDURE — 84443 ASSAY THYROID STIM HORMONE: CPT | Performed by: PHYSICIAN ASSISTANT

## 2023-07-06 PROCEDURE — 86803 HEPATITIS C AB TEST: CPT | Performed by: PHYSICIAN ASSISTANT

## 2023-07-06 PROCEDURE — 80053 COMPREHEN METABOLIC PANEL: CPT | Performed by: PHYSICIAN ASSISTANT

## 2023-07-06 PROCEDURE — 36415 COLL VENOUS BLD VENIPUNCTURE: CPT | Performed by: PHYSICIAN ASSISTANT

## 2023-07-06 PROCEDURE — 82728 ASSAY OF FERRITIN: CPT | Performed by: PHYSICIAN ASSISTANT

## 2023-07-06 PROCEDURE — 84466 ASSAY OF TRANSFERRIN: CPT | Performed by: PHYSICIAN ASSISTANT

## 2023-07-06 PROCEDURE — 85025 COMPLETE CBC W/AUTO DIFF WBC: CPT | Performed by: PHYSICIAN ASSISTANT

## 2023-07-06 NOTE — PROGRESS NOTES
Please contact lab to add-on    Orders Placed This Encounter   Procedures    Ferritin    Iron and TIBC     ICD code D64.9

## 2023-07-09 NOTE — PROGRESS NOTES
"Subjective     Patient ID: Claudio Herr is a 43 y.o. male.    Chief Complaint: Annual Exam    HPI    Established pt of Naeem Chamorro MD     Here for annual exam.     He is doing well.   No acute complaints  BP elevated today, no meds today    Past Medical History:   Diagnosis Date    Diabetes mellitus type II     Hyperlipidemia     Hypertension     Vitamin D deficiency disease      Social History     Tobacco Use    Smoking status: Every Day     Packs/day: 0.33     Years: 8.00     Pack years: 2.64     Types: Cigarettes    Smokeless tobacco: Never   Substance Use Topics    Alcohol use: Yes     Comment: occasional    Drug use: No     Review of patient's allergies indicates:   Allergen Reactions    No known drug allergies           Review of Systems   Constitutional:  Negative for chills, fever and unexpected weight change.   Respiratory:  Negative for cough, shortness of breath and wheezing.    Cardiovascular:  Negative for chest pain and leg swelling.   Gastrointestinal:  Negative for abdominal pain, nausea and vomiting.   Endocrine: Negative for polydipsia, polyphagia and polyuria.   Integumentary:  Negative for rash.   Neurological:  Negative for weakness and headaches.        Objective  BP (!) 152/82 (BP Location: Right arm, Patient Position: Sitting, BP Method: Large (Manual))   Pulse 85   Ht 6' 2" (1.88 m)   Wt 106.6 kg (235 lb 0.2 oz)   SpO2 98%   BMI 30.17 kg/m²       Physical Exam  Vitals reviewed.   Constitutional:       General: He is not in acute distress.     Appearance: He is well-developed.   HENT:      Head: Normocephalic and atraumatic.      Right Ear: Tympanic membrane, ear canal and external ear normal.      Left Ear: Tympanic membrane, ear canal and external ear normal.   Cardiovascular:      Rate and Rhythm: Normal rate and regular rhythm.      Heart sounds: Murmur heard.   Pulmonary:      Effort: Pulmonary effort is normal.      Breath sounds: Normal breath sounds. No wheezing or " rales.   Abdominal:      General: Bowel sounds are normal.      Palpations: Abdomen is soft.      Tenderness: There is no abdominal tenderness.   Musculoskeletal:      Right lower leg: No edema.      Left lower leg: No edema.   Skin:     General: Skin is warm and dry.      Findings: No rash.   Neurological:      Mental Status: He is alert.   Psychiatric:         Mood and Affect: Mood normal.          Assessment and Plan     1. Annual physical exam  HM reviewed and updated  Lab ordered (pt to schedule)  -     Hepatitis C Antibody; Future; Expected date: 07/05/2023    2. Type 2 diabetes mellitus with microalbuminuria, without long-term current use of insulin  Lab Results   Component Value Date    HGBA1C 5.0 07/06/2023   Well controlled  -     dulaglutide (TRULICITY) 1.5 mg/0.5 mL pen injector; INJECT 1.5MG SUBCUTANEOUSLY EVERY 7 DAYS  Dispense: 4 pen; Refill: 3    3. HTN (hypertension), benign  -     losartan (COZAAR) 100 MG tablet; Take 1 tablet (100 mg total) by mouth once daily.  Dispense: 90 tablet; Refill: 3  -     hydroCHLOROthiazide (HYDRODIURIL) 25 MG tablet; Take 1 tablet (25 mg total) by mouth once daily.  Dispense: 90 tablet; Refill: 3    4. Mixed dyslipidemia  Comments:  resume statin  Orders:  -     atorvastatin (LIPITOR) 40 MG tablet; Take 1 tablet (40 mg total) by mouth once daily.  Dispense: 90 tablet; Refill: 3    5. Essential hypertension  Med adherence discussed  Pt to send BP log in 2 weeks  -     amLODIPine (NORVASC) 5 MG tablet; Take 1 tablet (5 mg total) by mouth once daily.  Dispense: 90 tablet; Refill: 3    6. Cardiac murmur  -     Echo; Future    7. Current smoker  -     Ambulatory referral/consult to Smoking Cessation Program; Future; Expected date: 07/12/2023      RTC in 1 year for next annual or sooner if needed.     Cassidy Santana PA-C

## 2023-07-25 ENCOUNTER — OFFICE VISIT (OUTPATIENT)
Dept: OPTOMETRY | Facility: CLINIC | Age: 43
End: 2023-07-25
Payer: COMMERCIAL

## 2023-07-25 DIAGNOSIS — H52.13 MYOPIA OF BOTH EYES WITH REGULAR ASTIGMATISM: ICD-10-CM

## 2023-07-25 DIAGNOSIS — H52.223 MYOPIA OF BOTH EYES WITH REGULAR ASTIGMATISM: ICD-10-CM

## 2023-07-25 DIAGNOSIS — E11.9 TYPE 2 DIABETES MELLITUS WITHOUT RETINOPATHY: Primary | ICD-10-CM

## 2023-07-25 PROCEDURE — 99999 PR PBB SHADOW E&M-EST. PATIENT-LVL II: ICD-10-PCS | Mod: PBBFAC,,, | Performed by: OPTOMETRIST

## 2023-07-25 PROCEDURE — 3044F HG A1C LEVEL LT 7.0%: CPT | Mod: CPTII,S$GLB,, | Performed by: OPTOMETRIST

## 2023-07-25 PROCEDURE — 92015 DETERMINE REFRACTIVE STATE: CPT | Mod: S$GLB,,, | Performed by: OPTOMETRIST

## 2023-07-25 PROCEDURE — 2023F DILAT RTA XM W/O RTNOPTHY: CPT | Mod: CPTII,S$GLB,, | Performed by: OPTOMETRIST

## 2023-07-25 PROCEDURE — 92014 COMPRE OPH EXAM EST PT 1/>: CPT | Mod: S$GLB,,, | Performed by: OPTOMETRIST

## 2023-07-25 PROCEDURE — 92015 PR REFRACTION: ICD-10-PCS | Mod: S$GLB,,, | Performed by: OPTOMETRIST

## 2023-07-25 PROCEDURE — 92014 PR EYE EXAM, EST PATIENT,COMPREHESV: ICD-10-PCS | Mod: S$GLB,,, | Performed by: OPTOMETRIST

## 2023-07-25 PROCEDURE — 4010F PR ACE/ARB THEARPY RXD/TAKEN: ICD-10-PCS | Mod: CPTII,S$GLB,, | Performed by: OPTOMETRIST

## 2023-07-25 PROCEDURE — 99999 PR PBB SHADOW E&M-EST. PATIENT-LVL II: CPT | Mod: PBBFAC,,, | Performed by: OPTOMETRIST

## 2023-07-25 PROCEDURE — 4010F ACE/ARB THERAPY RXD/TAKEN: CPT | Mod: CPTII,S$GLB,, | Performed by: OPTOMETRIST

## 2023-07-25 PROCEDURE — 2023F PR DILATED RETINAL EXAM W/O EVID OF RETINOPATHY: ICD-10-PCS | Mod: CPTII,S$GLB,, | Performed by: OPTOMETRIST

## 2023-07-25 PROCEDURE — 1159F MED LIST DOCD IN RCRD: CPT | Mod: CPTII,S$GLB,, | Performed by: OPTOMETRIST

## 2023-07-25 PROCEDURE — 1159F PR MEDICATION LIST DOCUMENTED IN MEDICAL RECORD: ICD-10-PCS | Mod: CPTII,S$GLB,, | Performed by: OPTOMETRIST

## 2023-07-25 PROCEDURE — 3044F PR MOST RECENT HEMOGLOBIN A1C LEVEL <7.0%: ICD-10-PCS | Mod: CPTII,S$GLB,, | Performed by: OPTOMETRIST

## 2023-07-25 NOTE — PROGRESS NOTES
HPI    Diabetic eye exam    BOBBY:[birth year]    43 y.o. is here for diabetic eye exam  Pt feels vision has slightly changed  Pt lost sv gl  Pt reports no issue with near vision with his previous specs or now   without his specs  Pt wants to get a new rx for gl    (-) Changes in vision   (-) Pain  (-) Irritation   (-) Itching   (-) Flashes  (+) Floaters, longstanding   (-) Glasses wearer  (-) CL wearer  (-) Uses eye gtts    Does patient want a refraction today? yes    (-) Eye injury  (-) Eye surgery   (-)POHx  (-)FOHx    (+)DM  Hemoglobin A1C       Date                     Value               Ref Range             Status                07/06/2023               5.0                 4.0 - 5.6 %           Final                  01/05/2022               5.3                 4.0 - 5.6 %           Final                 05/03/2021               6.2 (H)             4.0 - 5.6 %           Final              Last edited by Massiel Mckee, OD on 7/25/2023  1:28 PM.            Assessment /Plan     For exam results, see Encounter Report.    Type 2 diabetes mellitus without retinopathy    Myopia of both eyes with regular astigmatism      No retinopathy noted, OU. Continue proper BS control and annual diabetic eye exams. Monitor yearly.      2. Updated SRx. Monitor yearly.        RTC in 1 year for annual DM eye exam or sooner if needed.

## 2023-07-25 NOTE — LETTER
July 25, 2023      [facility]  [address]  [facility] [address]  Phone: [phone]       Patient: Claudio Herr   YOB: 1980  Date of Visit: 07/25/2023    To Whom It May Concern:    Jacki Herr  was at [facility] on 07/25/2023. The patient may return to work/school on 07/25/2023 with no restrictions. If you have any questions or concerns, or if I can be of further assistance, please do not hesitate to contact me.    Sincerely,

## 2023-07-26 ENCOUNTER — HOSPITAL ENCOUNTER (OUTPATIENT)
Dept: CARDIOLOGY | Facility: HOSPITAL | Age: 43
Discharge: HOME | End: 2023-07-26
Attending: PHYSICIAN ASSISTANT
Payer: COMMERCIAL

## 2023-07-26 VITALS
HEART RATE: 75 BPM | WEIGHT: 235 LBS | SYSTOLIC BLOOD PRESSURE: 152 MMHG | BODY MASS INDEX: 30.16 KG/M2 | DIASTOLIC BLOOD PRESSURE: 82 MMHG | HEIGHT: 74 IN

## 2023-07-26 DIAGNOSIS — R01.1 CARDIAC MURMUR: ICD-10-CM

## 2023-07-26 LAB
ASCENDING AORTA: 3.39 CM
AV INDEX (PROSTH): 0.91
AV MEAN GRADIENT: 3 MMHG
AV PEAK GRADIENT: 6 MMHG
AV VALVE AREA: 4.87 CM2
AV VELOCITY RATIO: 0.96
BSA FOR ECHO PROCEDURE: 2.36 M2
CV ECHO LV RWT: 0.46 CM
DOP CALC AO PEAK VEL: 1.23 M/S
DOP CALC AO VTI: 24.95 CM
DOP CALC LVOT AREA: 5.3 CM2
DOP CALC LVOT DIAMETER: 2.61 CM
DOP CALC LVOT PEAK VEL: 1.18 M/S
DOP CALC LVOT STROKE VOLUME: 121.44 CM3
DOP CALCLVOT PEAK VEL VTI: 22.71 CM
E WAVE DECELERATION TIME: 187.07 MSEC
E/A RATIO: 1.3
E/E' RATIO: 6.67 M/S
ECHO LV POSTERIOR WALL: 1.16 CM (ref 0.6–1.1)
EJECTION FRACTION: 60 %
FRACTIONAL SHORTENING: 29 % (ref 28–44)
INTERVENTRICULAR SEPTUM: 0.89 CM (ref 0.6–1.1)
IVRT: 83.73 MSEC
LA MAJOR: 4.81 CM
LA MINOR: 4.98 CM
LA WIDTH: 4.4 CM
LEFT ATRIUM SIZE: 3.8 CM
LEFT ATRIUM VOLUME INDEX MOD: 30.9 ML/M2
LEFT ATRIUM VOLUME INDEX: 29.8 ML/M2
LEFT ATRIUM VOLUME MOD: 71.94 CM3
LEFT ATRIUM VOLUME: 69.55 CM3
LEFT INTERNAL DIMENSION IN SYSTOLE: 3.55 CM (ref 2.1–4)
LEFT VENTRICLE DIASTOLIC VOLUME INDEX: 51.27 ML/M2
LEFT VENTRICLE DIASTOLIC VOLUME: 119.47 ML
LEFT VENTRICLE MASS INDEX: 81 G/M2
LEFT VENTRICLE SYSTOLIC VOLUME INDEX: 22.6 ML/M2
LEFT VENTRICLE SYSTOLIC VOLUME: 52.71 ML
LEFT VENTRICULAR INTERNAL DIMENSION IN DIASTOLE: 5.02 CM (ref 3.5–6)
LEFT VENTRICULAR MASS: 189.37 G
LV LATERAL E/E' RATIO: 6 M/S
LV SEPTAL E/E' RATIO: 7.5 M/S
MV A" WAVE DURATION": 19.03 MSEC
MV PEAK A VEL: 0.46 M/S
MV PEAK E VEL: 0.6 M/S
MV STENOSIS PRESSURE HALF TIME: 54.25 MS
MV VALVE AREA P 1/2 METHOD: 4.06 CM2
PISA TR MAX VEL: 2.72 M/S
PULM VEIN S/D RATIO: 0.94
PV PEAK D VEL: 0.34 M/S
PV PEAK S VEL: 0.32 M/S
RA MAJOR: 4.95 CM
RA PRESSURE: 3 MMHG
RA WIDTH: 4.74 CM
RIGHT VENTRICULAR END-DIASTOLIC DIMENSION: 4.18 CM
SINUS: 3.58 CM
STJ: 2.99 CM
TDI LATERAL: 0.1 M/S
TDI SEPTAL: 0.08 M/S
TDI: 0.09 M/S
TR MAX PG: 30 MMHG
TRICUSPID ANNULAR PLANE SYSTOLIC EXCURSION: 2.7 CM
TV REST PULMONARY ARTERY PRESSURE: 33 MMHG

## 2023-07-26 PROCEDURE — 93306 TTE W/DOPPLER COMPLETE: CPT

## 2023-07-26 PROCEDURE — 93306 ECHO (CUPID ONLY): ICD-10-PCS | Mod: 26,,, | Performed by: INTERNAL MEDICINE

## 2023-07-26 PROCEDURE — 93306 TTE W/DOPPLER COMPLETE: CPT | Mod: 26,,, | Performed by: INTERNAL MEDICINE

## 2023-07-31 ENCOUNTER — TELEPHONE (OUTPATIENT)
Dept: INTERNAL MEDICINE | Facility: CLINIC | Age: 43
End: 2023-07-31
Payer: COMMERCIAL

## 2023-07-31 DIAGNOSIS — D64.9 ANEMIA, UNSPECIFIED TYPE: Primary | ICD-10-CM

## 2023-07-31 NOTE — TELEPHONE ENCOUNTER
----- Message from Dipika Elkins sent at 7/31/2023  1:48 PM CDT -----  Contact: Mrs. Herr Phone# [phone]  Patients wife Mrs. Herr would like for you to give her a call so that she can get clarification on the patients latest Ultrasound Results.

## 2023-08-01 ENCOUNTER — PATIENT MESSAGE (OUTPATIENT)
Dept: INTERNAL MEDICINE | Facility: CLINIC | Age: 43
End: 2023-08-01
Payer: COMMERCIAL

## 2023-09-07 ENCOUNTER — OFFICE VISIT (OUTPATIENT)
Dept: INTERNAL MEDICINE | Facility: CLINIC | Age: 43
End: 2023-09-07
Payer: COMMERCIAL

## 2023-09-07 VITALS
SYSTOLIC BLOOD PRESSURE: 108 MMHG | HEART RATE: 87 BPM | BODY MASS INDEX: 29.12 KG/M2 | WEIGHT: 226.88 LBS | HEIGHT: 74 IN | OXYGEN SATURATION: 98 % | DIASTOLIC BLOOD PRESSURE: 74 MMHG

## 2023-09-07 DIAGNOSIS — R21 RASH: Primary | ICD-10-CM

## 2023-09-07 DIAGNOSIS — L25.9 CONTACT DERMATITIS, UNSPECIFIED CONTACT DERMATITIS TYPE, UNSPECIFIED TRIGGER: ICD-10-CM

## 2023-09-07 PROCEDURE — 4010F ACE/ARB THERAPY RXD/TAKEN: CPT | Mod: CPTII,S$GLB,, | Performed by: PHYSICIAN ASSISTANT

## 2023-09-07 PROCEDURE — 1160F RVW MEDS BY RX/DR IN RCRD: CPT | Mod: CPTII,S$GLB,, | Performed by: PHYSICIAN ASSISTANT

## 2023-09-07 PROCEDURE — 99213 PR OFFICE/OUTPT VISIT, EST, LEVL III, 20-29 MIN: ICD-10-PCS | Mod: S$GLB,,, | Performed by: PHYSICIAN ASSISTANT

## 2023-09-07 PROCEDURE — 99999 PR PBB SHADOW E&M-EST. PATIENT-LVL IV: ICD-10-PCS | Mod: PBBFAC,,, | Performed by: PHYSICIAN ASSISTANT

## 2023-09-07 PROCEDURE — 1159F PR MEDICATION LIST DOCUMENTED IN MEDICAL RECORD: ICD-10-PCS | Mod: CPTII,S$GLB,, | Performed by: PHYSICIAN ASSISTANT

## 2023-09-07 PROCEDURE — 3044F HG A1C LEVEL LT 7.0%: CPT | Mod: CPTII,S$GLB,, | Performed by: PHYSICIAN ASSISTANT

## 2023-09-07 PROCEDURE — 99213 OFFICE O/P EST LOW 20 MIN: CPT | Mod: S$GLB,,, | Performed by: PHYSICIAN ASSISTANT

## 2023-09-07 PROCEDURE — 99999 PR PBB SHADOW E&M-EST. PATIENT-LVL IV: CPT | Mod: PBBFAC,,, | Performed by: PHYSICIAN ASSISTANT

## 2023-09-07 PROCEDURE — 4010F PR ACE/ARB THEARPY RXD/TAKEN: ICD-10-PCS | Mod: CPTII,S$GLB,, | Performed by: PHYSICIAN ASSISTANT

## 2023-09-07 PROCEDURE — 3008F PR BODY MASS INDEX (BMI) DOCUMENTED: ICD-10-PCS | Mod: CPTII,S$GLB,, | Performed by: PHYSICIAN ASSISTANT

## 2023-09-07 PROCEDURE — 1160F PR REVIEW ALL MEDS BY PRESCRIBER/CLIN PHARMACIST DOCUMENTED: ICD-10-PCS | Mod: CPTII,S$GLB,, | Performed by: PHYSICIAN ASSISTANT

## 2023-09-07 PROCEDURE — 3008F BODY MASS INDEX DOCD: CPT | Mod: CPTII,S$GLB,, | Performed by: PHYSICIAN ASSISTANT

## 2023-09-07 PROCEDURE — 3078F PR MOST RECENT DIASTOLIC BLOOD PRESSURE < 80 MM HG: ICD-10-PCS | Mod: CPTII,S$GLB,, | Performed by: PHYSICIAN ASSISTANT

## 2023-09-07 PROCEDURE — 1159F MED LIST DOCD IN RCRD: CPT | Mod: CPTII,S$GLB,, | Performed by: PHYSICIAN ASSISTANT

## 2023-09-07 PROCEDURE — 3044F PR MOST RECENT HEMOGLOBIN A1C LEVEL <7.0%: ICD-10-PCS | Mod: CPTII,S$GLB,, | Performed by: PHYSICIAN ASSISTANT

## 2023-09-07 PROCEDURE — 3078F DIAST BP <80 MM HG: CPT | Mod: CPTII,S$GLB,, | Performed by: PHYSICIAN ASSISTANT

## 2023-09-07 PROCEDURE — 3074F PR MOST RECENT SYSTOLIC BLOOD PRESSURE < 130 MM HG: ICD-10-PCS | Mod: CPTII,S$GLB,, | Performed by: PHYSICIAN ASSISTANT

## 2023-09-07 PROCEDURE — 3074F SYST BP LT 130 MM HG: CPT | Mod: CPTII,S$GLB,, | Performed by: PHYSICIAN ASSISTANT

## 2023-09-07 RX ORDER — TRIAMCINOLONE ACETONIDE 1 MG/G
CREAM TOPICAL 2 TIMES DAILY
Qty: 80 G | Refills: 0 | Status: SHIPPED | OUTPATIENT
Start: 2023-09-07

## 2023-09-07 NOTE — LETTER
September 7, 2023    Claudio Herr  [address] [facility] [address]       [facility]  Internal Medicine  [address]  [facility] [address]  Phone: [phone]  Fax: [phone]   September 7, 2023     Patient: Claudio Herr   YOB: 1980   Date of Visit: 9/7/2023       To Whom it May Concern:    Claudio Herr was seen in my clinic on 9/7/2023. He may return to work on 09/11/2023 .    Please excuse him from any classes or work missed.    If you have any questions or concerns, please don't hesitate to call.    Sincerely,           Cassidy Santana PA-C

## 2023-09-07 NOTE — PROGRESS NOTES
"Subjective     Patient ID: Claudio Herr is a 43 y.o. male.    Chief Complaint: Rash (R forearm/)    HPI    Established pt of Naeem Chamorro MD     Here with concerns of itchy rash to right forearm, onset about 2 to 3 weeks ago. No new lotions, soaps or detergents. 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 He has tried soap and water. Itchiness worse in the sun.     Past Medical History:   Diagnosis Date    Diabetes mellitus type II     Hyperlipidemia     Hypertension     Vitamin D deficiency disease      Social History     Tobacco Use    Smoking status: Every Day     Current packs/day: 0.33     Average packs/day: 0.3 packs/day for 8.0 years (2.6 ttl pk-yrs)     Types: Cigarettes    Smokeless tobacco: Never   Substance Use Topics    Alcohol use: Yes     Comment: occasional    Drug use: No     Review of patient's allergies indicates:   Allergen Reactions    No known drug allergies          Review of Systems   Constitutional:  Negative for chills, fever and unexpected weight change.   Gastrointestinal:  Negative for nausea and vomiting.   Integumentary:  Positive for rash. Negative for wound.   Neurological:  Negative for weakness and numbness.          Objective  /74 (BP Location: Left arm, Patient Position: Sitting, BP Method: Large (Manual))   Pulse 87   Ht 6' 2" (1.88 m)   Wt 102.9 kg (226 lb 13.7 oz)   SpO2 98%   BMI 29.13 kg/m²       Physical Exam  Vitals reviewed.   Constitutional:       General: He is not in acute distress.     Appearance: He is well-developed.   Cardiovascular:      Rate and Rhythm: Regular rhythm.   Pulmonary:      Effort: Pulmonary effort is normal. No respiratory distress.   Musculoskeletal:      Right lower leg: No edema.      Left lower leg: No edema.   Skin:     General: Skin is warm and dry.      Findings: Rash present. Rash is macular and papular. Rash is not scaling or vesicular.      Comments: Right forearm, " maculopapular, patch with mild erythema with faint hypopigmentation.    Neurological:      Mental Status: He is alert.                  Assessment and Plan     1. Rash  -     triamcinolone acetonide 0.1% (KENALOG) 0.1 % cream; Apply topically 2 (two) times daily.  Dispense: 80 g; Refill: 0    2. Contact dermatitis, unspecified contact dermatitis type, unspecified trigger  -     triamcinolone acetonide 0.1% (KENALOG) 0.1 % cream; Apply topically 2 (two) times daily.  Dispense: 80 g; Refill: 0      Advised on sunscreen, long sleeves, avoiding sun  Notify clinic if symptoms worsen or fail to improve    Cassidy Santana PA-C

## 2023-10-26 ENCOUNTER — OCCUPATIONAL HEALTH (OUTPATIENT)
Dept: URGENT CARE | Facility: CLINIC | Age: 43
End: 2023-10-26

## 2023-10-26 DIAGNOSIS — Z13.9 ENCOUNTER FOR SCREENING: Primary | ICD-10-CM

## 2023-10-26 PROCEDURE — 99199 OCC MED MRO FEE: ICD-10-PCS | Mod: S$GLB,,,

## 2023-10-26 PROCEDURE — 80305 DRUG TEST PRSMV DIR OPT OBS: CPT | Mod: S$GLB,,,

## 2023-10-26 PROCEDURE — 80305 OOH COLLECTION ONLY DRUG SCREEN: ICD-10-PCS | Mod: S$GLB,,,

## 2023-10-26 PROCEDURE — 99199 UNLISTED SPECIAL SVC PX/RPRT: CPT | Mod: S$GLB,,,

## 2023-11-20 DIAGNOSIS — R80.9 TYPE 2 DIABETES MELLITUS WITH MICROALBUMINURIA, WITHOUT LONG-TERM CURRENT USE OF INSULIN: ICD-10-CM

## 2023-11-20 DIAGNOSIS — E11.29 TYPE 2 DIABETES MELLITUS WITH MICROALBUMINURIA, WITHOUT LONG-TERM CURRENT USE OF INSULIN: ICD-10-CM

## 2023-11-20 RX ORDER — DULAGLUTIDE 1.5 MG/.5ML
INJECTION, SOLUTION SUBCUTANEOUS
Qty: 4 PEN | Refills: 3 | Status: SHIPPED | OUTPATIENT
Start: 2023-11-20 | End: 2024-03-27

## 2024-03-27 DIAGNOSIS — E11.29 TYPE 2 DIABETES MELLITUS WITH MICROALBUMINURIA, WITHOUT LONG-TERM CURRENT USE OF INSULIN: ICD-10-CM

## 2024-03-27 DIAGNOSIS — R80.9 TYPE 2 DIABETES MELLITUS WITH MICROALBUMINURIA, WITHOUT LONG-TERM CURRENT USE OF INSULIN: ICD-10-CM

## 2024-03-27 RX ORDER — DULAGLUTIDE 1.5 MG/.5ML
INJECTION, SOLUTION SUBCUTANEOUS
Qty: 4 PEN | Refills: 3 | Status: SHIPPED | OUTPATIENT
Start: 2024-03-27 | End: 2024-05-28

## 2024-05-28 DIAGNOSIS — R80.9 TYPE 2 DIABETES MELLITUS WITH MICROALBUMINURIA, WITHOUT LONG-TERM CURRENT USE OF INSULIN: ICD-10-CM

## 2024-05-28 DIAGNOSIS — E11.29 TYPE 2 DIABETES MELLITUS WITH MICROALBUMINURIA, WITHOUT LONG-TERM CURRENT USE OF INSULIN: ICD-10-CM

## 2024-05-28 RX ORDER — DULAGLUTIDE 1.5 MG/.5ML
INJECTION, SOLUTION SUBCUTANEOUS
Qty: 4 PEN | Refills: 6 | Status: SHIPPED | OUTPATIENT
Start: 2024-05-28

## 2024-06-11 ENCOUNTER — HOSPITAL ENCOUNTER (EMERGENCY)
Facility: HOSPITAL | Age: 44
Discharge: HOME | End: 2024-06-11
Attending: STUDENT IN AN ORGANIZED HEALTH CARE EDUCATION/TRAINING PROGRAM
Payer: COMMERCIAL

## 2024-06-11 VITALS
HEIGHT: 75 IN | TEMPERATURE: 98 F | HEART RATE: 90 BPM | RESPIRATION RATE: 18 BRPM | BODY MASS INDEX: 29.47 KG/M2 | SYSTOLIC BLOOD PRESSURE: 154 MMHG | OXYGEN SATURATION: 96 % | DIASTOLIC BLOOD PRESSURE: 86 MMHG | WEIGHT: 237 LBS

## 2024-06-11 DIAGNOSIS — S39.011A ABDOMINAL WALL STRAIN, INITIAL ENCOUNTER: Primary | ICD-10-CM

## 2024-06-11 PROCEDURE — 99283 EMERGENCY DEPT VISIT LOW MDM: CPT | Mod: ER

## 2024-06-11 PROCEDURE — 25000003 PHARM REV CODE 250: Mod: ER | Performed by: STUDENT IN AN ORGANIZED HEALTH CARE EDUCATION/TRAINING PROGRAM

## 2024-06-11 RX ORDER — METHOCARBAMOL 750 MG/1
1500 TABLET, FILM COATED ORAL 3 TIMES DAILY
Qty: 30 TABLET | Refills: 0 | Status: SHIPPED | OUTPATIENT
Start: 2024-06-11 | End: 2024-06-16

## 2024-06-11 RX ORDER — METHOCARBAMOL 750 MG/1
1500 TABLET, FILM COATED ORAL 3 TIMES DAILY
Qty: 30 TABLET | Refills: 0 | Status: SHIPPED | OUTPATIENT
Start: 2024-06-11 | End: 2024-06-11

## 2024-06-11 RX ORDER — METHOCARBAMOL 500 MG/1
1500 TABLET, FILM COATED ORAL
Status: COMPLETED | OUTPATIENT
Start: 2024-06-11 | End: 2024-06-11

## 2024-06-11 RX ADMIN — METHOCARBAMOL 1500 MG: 500 TABLET ORAL at 09:06

## 2024-06-11 NOTE — ED NOTES
Patient presents to ED with right side groin pain that extends into his hip; states he think he may have injured himself playing football.

## 2024-06-11 NOTE — DISCHARGE INSTRUCTIONS
These medications are available over the counter to help with musculoskeletal pain:   You can take two Aleve (Naproxen) every 12 hours with food.   You can take tylenol 500 mg every 6 hours.   You can use over the counter Salonpas LIDOCAINE 4% Pain Relieving Gel-Patch or something similar that contains lidocaine.

## 2024-06-11 NOTE — Clinical Note
"Claudio"Zayra Herr was seen and treated in our emergency department on 6/11/2024.  He may return to work on 06/14/2024.       If you have any questions or concerns, please don't hesitate to call.      Yasmine De La Cruz, DO"

## 2024-06-11 NOTE — ED PROVIDER NOTES
NAME:  Claudio Herr  CSN:     [id number]  MRN:    [id number]  ADMIT DATE: 6/11/2024        eMERGENCY dEPARTMENT eNCOUnter    CHIEF COMPLAINT    Chief Complaint   Patient presents with    Groin Pain     C/o pain to right groin and lower back. Pain radiates down right leg. States he thinks he pulled something yesterday.        HPI    Claudio Herr is a 44 y.o. male with a past medical history of  has a past medical history of Diabetes mellitus type II, Hyperlipidemia, Hypertension, and Vitamin D deficiency disease.     he presents to the ED due to to the right side into the inguinal region.  States this occurred shortly after throwing a football yesterday.  Notes pain with certain movements only at this time.  Denies specifically testicular pain or any sort of protrusion at this time.  No difficulty urinating or with bowel movements.  Works digging for living.  Has used salon pause as well as BC powder with some relief.      HPI       PAST MEDICAL HISTORY  Past Medical History:   Diagnosis Date    Diabetes mellitus type II     Hyperlipidemia     Hypertension     Vitamin D deficiency disease        SURGICAL HISTORY    History reviewed. No pertinent surgical history.    FAMILY HISTORY    Family History   Problem Relation Name Age of Onset    Diabetes Mother      Hypertension Mother      Cancer Mother      Diabetes Sister      Hypertension Sister      Diabetes Maternal Grandmother      No Known Problems Son      No Known Problems Son      No Known Problems Son      No Known Problems Daughter      Amblyopia Neg Hx      Blindness Neg Hx      Cataracts Neg Hx      Glaucoma Neg Hx      Macular degeneration Neg Hx      Retinal detachment Neg Hx      Strabismus Neg Hx      Stroke Neg Hx      Thyroid disease Neg Hx         SOCIAL HISTORY    Social History     Socioeconomic History    Marital status: Single   Tobacco Use    Smoking status: Every Day     Current packs/day: 0.33     Average packs/day: 0.3 packs/day for 8.0 years  "(2.6 ttl pk-yrs)     Types: Cigarettes    Smokeless tobacco: Never   Substance and Sexual Activity    Alcohol use: Yes     Comment: occasional    Drug use: No   Social History Narrative    Lives with wife, 4 children. Works for [employer].    Not sedentary at work.       MEDICATIONS  Current Outpatient Medications   Medication Instructions    amLODIPine (NORVASC) 5 mg, Oral, Daily    atorvastatin (LIPITOR) 40 mg, Oral, Daily    blood sugar diagnostic Strp 1 strip, Misc.(Non-Drug; Combo Route), Daily    blood-glucose meter kit Use as instructed    dulaglutide (TRULICITY) 1.5 mg/0.5 mL pen injector INJECT 1.5 MG SUBCUTANEOUSLY EVERY 7 DAYS    hydroCHLOROthiazide (HYDRODIURIL) 25 mg, Oral, Daily    lancets Misc 1 lancet , Misc.(Non-Drug; Combo Route), Daily    lancing device Misc 1 Device, Misc.(Non-Drug; Combo Route), Daily    losartan (COZAAR) 100 mg, Oral, Daily    methocarbamoL (ROBAXIN) 1,500 mg, Oral, 3 times daily    triamcinolone acetonide 0.1% (KENALOG) 0.1 % cream Topical (Top), 2 times daily       ALLERGIES    Review of patient's allergies indicates:   Allergen Reactions    No known drug allergies          REVIEW OF SYSTEMS   Review of Systems       PHYSICAL EXAM    Reviewed Triage Note    VITAL SIGNS:   ED Triage Vitals [06/11/24 0845]   Enc Vitals Group      BP (!) 154/86      Pulse 90      Resp 18      Temp 98.3 °F (36.8 °C)      Temp Source Oral      SpO2 96 %      Weight 237 lb      Height 6' 3"      Head Circumference       Peak Flow       Pain Score       Pain Loc       Pain Education       Exclude from Growth Chart        Patient Vitals for the past 24 hrs:   BP Temp Temp src Pulse Resp SpO2 Height Weight   06/11/24 0845 (!) 154/86 98.3 °F (36.8 °C) Oral 90 18 96 % 6' 3" (1.905 m) 107.5 kg (237 lb)       Physical Exam    Nursing note and vitals reviewed.  Constitutional: He appears well-developed and well-nourished.   HENT:   Head: Normocephalic and atraumatic.   Eyes: EOM are normal. Pupils " are equal, round, and reactive to light.   Neck: Neck supple.   Normal range of motion.  Cardiovascular:  Normal rate and regular rhythm.           Pulmonary/Chest: Breath sounds normal. No respiratory distress.   Abdominal: Abdomen is soft. There is no abdominal tenderness.   Genitourinary:    Genitourinary Comments: Deferred, patient denies abnormality     Musculoskeletal:         General: No tenderness or edema. Normal range of motion.      Cervical back: Normal range of motion and neck supple.     Neurological: He is alert and oriented to person, place, and time.   Skin: Skin is warm and dry.   Psychiatric: He has a normal mood and affect.                EKG     Interpreted by EM physician if performed:               LABS  Pertinent labs reviewed. (See chart for details)   Labs Reviewed - No data to display      RADIOLOGY          Imaging Results    None           PROCEDURES    Procedures      ED COURSE & MEDICAL DECISION MAKING    Pertinent Labs & Imaging studies reviewed. (See chart for details and specific orders.)          Summary of review of records:   Last visit with primary care in September 20, 2023.  History of diabetes, hypertension    Medical Decision Making  Amount and/or Complexity of Data Reviewed  Independent Historian: spouse  External Data Reviewed: notes.    Risk  Prescription drug management.      Claudio Herr is a 44 y.o. male who presents for right sided abdominal pain after throwing a football yesterday.  Presentation is most consistent with muscle strain.  Is using anti-inflammatories as well as a long pause at home and is requesting muscle relaxer at this time.  While hernia was a consideration he denies any abnormalities from that standpoint at time.  No concern for testicular torsion or more so at this time instructions on supportive care provided.  Reasons to return discussed and patient stable at time of discharge.            Medications   methocarbamoL tablet 1,500 mg (1,500 mg  Oral Given 6/11/24 0920)                  FINAL IMPRESSION    Final diagnoses:  [S39.011A] Abdominal wall strain, initial encounter (Primary)       DISPOSITION  Patient discharge in stable condition        ED Prescriptions       Medication Sig Dispense Start Date End Date Auth. Provider    methocarbamoL (ROBAXIN) 750 MG Tab  (Status: Discontinued) Take 2 tablets (1,500 mg total) by mouth 3 (three) times daily. for 5 days 30 tablet 6/11/2024 6/11/2024 Yasmine De La Cruz DO    methocarbamoL (ROBAXIN) 750 MG Tab Take 2 tablets (1,500 mg total) by mouth 3 (three) times daily. for 5 days 30 tablet 6/11/2024 6/16/2024 Yasmine De La Cruz DO          Follow-up Information       Follow up With Specialties Details Why Contact Info    Naeem Chamorro MD Internal Medicine Schedule an appointment as soon as possible for a visit in 1 week  [address] [place] [phone]  [phone]      [facility] - Emergency Dept Emergency Medicine  As needed, If symptoms worsen [address] [facility] [address] [phone]              DISCLAIMER: This note was prepared with [employer]*Stribe voice recognition transcription software. Garbled syntax, mangled pronouns, and other bizarre constructions may be attributed to that software system.             Yasmine De La Cruz DO  06/11/24 0947

## 2024-07-18 DIAGNOSIS — I10 ESSENTIAL HYPERTENSION: ICD-10-CM

## 2024-07-18 DIAGNOSIS — E78.2 MIXED DYSLIPIDEMIA: ICD-10-CM

## 2024-07-18 DIAGNOSIS — I10 HTN (HYPERTENSION), BENIGN: ICD-10-CM

## 2024-07-18 RX ORDER — HYDROCHLOROTHIAZIDE 25 MG/1
25 TABLET ORAL
Qty: 90 TABLET | Refills: 2 | Status: SHIPPED | OUTPATIENT
Start: 2024-07-18

## 2024-07-18 RX ORDER — LOSARTAN POTASSIUM 100 MG/1
100 TABLET ORAL
Qty: 90 TABLET | Refills: 2 | Status: SHIPPED | OUTPATIENT
Start: 2024-07-18

## 2024-07-18 RX ORDER — ATORVASTATIN CALCIUM 40 MG/1
40 TABLET, FILM COATED ORAL
Qty: 90 TABLET | Refills: 2 | Status: SHIPPED | OUTPATIENT
Start: 2024-07-18

## 2024-07-18 RX ORDER — AMLODIPINE BESYLATE 5 MG/1
5 TABLET ORAL
Qty: 90 TABLET | Refills: 2 | Status: SHIPPED | OUTPATIENT
Start: 2024-07-18

## 2024-07-30 ENCOUNTER — OCCUPATIONAL HEALTH (OUTPATIENT)
Dept: URGENT CARE | Facility: CLINIC | Age: 44
End: 2024-07-30

## 2024-07-30 DIAGNOSIS — Z13.9 ENCOUNTER FOR SCREENING: Primary | ICD-10-CM

## 2024-09-16 ENCOUNTER — OFFICE VISIT (OUTPATIENT)
Dept: INTERNAL MEDICINE | Facility: CLINIC | Age: 44
End: 2024-09-16
Payer: COMMERCIAL

## 2024-09-16 ENCOUNTER — LAB VISIT (OUTPATIENT)
Dept: LAB | Facility: HOSPITAL | Age: 44
End: 2024-09-16
Payer: COMMERCIAL

## 2024-09-16 VITALS
SYSTOLIC BLOOD PRESSURE: 130 MMHG | WEIGHT: 236.56 LBS | HEIGHT: 75 IN | DIASTOLIC BLOOD PRESSURE: 70 MMHG | BODY MASS INDEX: 29.41 KG/M2 | HEART RATE: 84 BPM | OXYGEN SATURATION: 99 %

## 2024-09-16 DIAGNOSIS — E11.29 TYPE 2 DIABETES MELLITUS WITH MICROALBUMINURIA, WITHOUT LONG-TERM CURRENT USE OF INSULIN: ICD-10-CM

## 2024-09-16 DIAGNOSIS — I10 HTN (HYPERTENSION), BENIGN: ICD-10-CM

## 2024-09-16 DIAGNOSIS — E55.9 VITAMIN D DEFICIENCY: ICD-10-CM

## 2024-09-16 DIAGNOSIS — E78.2 MIXED HYPERLIPIDEMIA: ICD-10-CM

## 2024-09-16 DIAGNOSIS — R80.9 TYPE 2 DIABETES MELLITUS WITH MICROALBUMINURIA, WITHOUT LONG-TERM CURRENT USE OF INSULIN: ICD-10-CM

## 2024-09-16 DIAGNOSIS — N52.9 ERECTILE DYSFUNCTION, UNSPECIFIED ERECTILE DYSFUNCTION TYPE: ICD-10-CM

## 2024-09-16 DIAGNOSIS — D64.9 ANEMIA, UNSPECIFIED TYPE: ICD-10-CM

## 2024-09-16 DIAGNOSIS — Z76.89 ENCOUNTER TO ESTABLISH CARE WITH NEW DOCTOR: ICD-10-CM

## 2024-09-16 DIAGNOSIS — Z00.00 ENCOUNTER FOR ANNUAL GENERAL MEDICAL EXAMINATION WITHOUT ABNORMAL FINDINGS IN ADULT: ICD-10-CM

## 2024-09-16 DIAGNOSIS — Z00.00 ENCOUNTER FOR ANNUAL GENERAL MEDICAL EXAMINATION WITHOUT ABNORMAL FINDINGS IN ADULT: Primary | ICD-10-CM

## 2024-09-16 PROBLEM — E66.811 OBESITY (BMI 30.0-34.9): Status: RESOLVED | Noted: 2017-07-13 | Resolved: 2024-09-16

## 2024-09-16 PROBLEM — E66.9 OBESITY (BMI 30.0-34.9): Status: RESOLVED | Noted: 2017-07-13 | Resolved: 2024-09-16

## 2024-09-16 LAB
25(OH)D3+25(OH)D2 SERPL-MCNC: 31 NG/ML (ref 30–96)
ALBUMIN SERPL BCP-MCNC: 3.8 G/DL (ref 3.5–5.2)
ALBUMIN/CREAT UR: 27 UG/MG (ref 0–30)
ALP SERPL-CCNC: 73 U/L (ref 55–135)
ALT SERPL W/O P-5'-P-CCNC: 37 U/L (ref 10–44)
ANION GAP SERPL CALC-SCNC: 11 MMOL/L (ref 8–16)
AST SERPL-CCNC: 31 U/L (ref 10–40)
BASOPHILS # BLD AUTO: 0.04 K/UL (ref 0–0.2)
BASOPHILS NFR BLD: 0.3 % (ref 0–1.9)
BILIRUB SERPL-MCNC: 0.2 MG/DL (ref 0.1–1)
BUN SERPL-MCNC: 17 MG/DL (ref 6–20)
CALCIUM SERPL-MCNC: 9.6 MG/DL (ref 8.7–10.5)
CHLORIDE SERPL-SCNC: 105 MMOL/L (ref 95–110)
CHOLEST SERPL-MCNC: 131 MG/DL (ref 120–199)
CHOLEST/HDLC SERPL: 2.8 {RATIO} (ref 2–5)
CO2 SERPL-SCNC: 26 MMOL/L (ref 23–29)
CREAT SERPL-MCNC: 1.1 MG/DL (ref 0.5–1.4)
CREAT UR-MCNC: 174 MG/DL (ref 23–375)
DIFFERENTIAL METHOD BLD: ABNORMAL
EOSINOPHIL # BLD AUTO: 0.1 K/UL (ref 0–0.5)
EOSINOPHIL NFR BLD: 0.7 % (ref 0–8)
ERYTHROCYTE [DISTWIDTH] IN BLOOD BY AUTOMATED COUNT: 14.1 % (ref 11.5–14.5)
EST. GFR  (NO RACE VARIABLE): >60 ML/MIN/1.73 M^2
ESTIMATED AVG GLUCOSE: 100 MG/DL (ref 68–131)
FERRITIN SERPL-MCNC: 150 NG/ML (ref 20–300)
FOLATE SERPL-MCNC: 4.6 NG/ML (ref 4–24)
GLUCOSE SERPL-MCNC: 95 MG/DL (ref 70–110)
HBA1C MFR BLD: 5.1 % (ref 4–5.6)
HCT VFR BLD AUTO: 43.8 % (ref 40–54)
HDLC SERPL-MCNC: 46 MG/DL (ref 40–75)
HDLC SERPL: 35.1 % (ref 20–50)
HGB BLD-MCNC: 13.6 G/DL (ref 14–18)
IMM GRANULOCYTES # BLD AUTO: 0.1 K/UL (ref 0–0.04)
IMM GRANULOCYTES NFR BLD AUTO: 0.8 % (ref 0–0.5)
LDLC SERPL CALC-MCNC: 71.2 MG/DL (ref 63–159)
LYMPHOCYTES # BLD AUTO: 2 K/UL (ref 1–4.8)
LYMPHOCYTES NFR BLD: 15.5 % (ref 18–48)
MCH RBC QN AUTO: 30.5 PG (ref 27–31)
MCHC RBC AUTO-ENTMCNC: 31.1 G/DL (ref 32–36)
MCV RBC AUTO: 98 FL (ref 82–98)
MICROALBUMIN UR DL<=1MG/L-MCNC: 47 UG/ML
MONOCYTES # BLD AUTO: 0.8 K/UL (ref 0.3–1)
MONOCYTES NFR BLD: 6.5 % (ref 4–15)
NEUTROPHILS # BLD AUTO: 9.8 K/UL (ref 1.8–7.7)
NEUTROPHILS NFR BLD: 76.2 % (ref 38–73)
NONHDLC SERPL-MCNC: 85 MG/DL
NRBC BLD-RTO: 0 /100 WBC
PLATELET # BLD AUTO: 264 K/UL (ref 150–450)
PMV BLD AUTO: 10.7 FL (ref 9.2–12.9)
POTASSIUM SERPL-SCNC: 3.5 MMOL/L (ref 3.5–5.1)
PROT SERPL-MCNC: 7.5 G/DL (ref 6–8.4)
RBC # BLD AUTO: 4.46 M/UL (ref 4.6–6.2)
RETICS/RBC NFR AUTO: 1.4 % (ref 0.4–2)
SODIUM SERPL-SCNC: 142 MMOL/L (ref 136–145)
T4 FREE SERPL-MCNC: 1.02 NG/DL (ref 0.71–1.51)
TRIGL SERPL-MCNC: 69 MG/DL (ref 30–150)
TSH SERPL DL<=0.005 MIU/L-ACNC: 2.27 UIU/ML (ref 0.4–4)
VIT B12 SERPL-MCNC: 642 PG/ML (ref 210–950)
WBC # BLD AUTO: 12.81 K/UL (ref 3.9–12.7)

## 2024-09-16 PROCEDURE — 83036 HEMOGLOBIN GLYCOSYLATED A1C: CPT | Performed by: FAMILY MEDICINE

## 2024-09-16 PROCEDURE — 82306 VITAMIN D 25 HYDROXY: CPT | Performed by: FAMILY MEDICINE

## 2024-09-16 PROCEDURE — 82570 ASSAY OF URINE CREATININE: CPT | Performed by: FAMILY MEDICINE

## 2024-09-16 PROCEDURE — 3072F LOW RISK FOR RETINOPATHY: CPT | Mod: CPTII,S$GLB,, | Performed by: FAMILY MEDICINE

## 2024-09-16 PROCEDURE — 84439 ASSAY OF FREE THYROXINE: CPT | Performed by: FAMILY MEDICINE

## 2024-09-16 PROCEDURE — 82746 ASSAY OF FOLIC ACID SERUM: CPT | Performed by: FAMILY MEDICINE

## 2024-09-16 PROCEDURE — 84443 ASSAY THYROID STIM HORMONE: CPT | Performed by: FAMILY MEDICINE

## 2024-09-16 PROCEDURE — 80053 COMPREHEN METABOLIC PANEL: CPT | Performed by: FAMILY MEDICINE

## 2024-09-16 PROCEDURE — 3008F BODY MASS INDEX DOCD: CPT | Mod: CPTII,S$GLB,, | Performed by: FAMILY MEDICINE

## 2024-09-16 PROCEDURE — 80061 LIPID PANEL: CPT | Performed by: FAMILY MEDICINE

## 2024-09-16 PROCEDURE — 99999 PR PBB SHADOW E&M-EST. PATIENT-LVL IV: CPT | Mod: PBBFAC,,, | Performed by: FAMILY MEDICINE

## 2024-09-16 PROCEDURE — 85045 AUTOMATED RETICULOCYTE COUNT: CPT | Performed by: PHYSICIAN ASSISTANT

## 2024-09-16 PROCEDURE — 4010F ACE/ARB THERAPY RXD/TAKEN: CPT | Mod: CPTII,S$GLB,, | Performed by: FAMILY MEDICINE

## 2024-09-16 PROCEDURE — 1159F MED LIST DOCD IN RCRD: CPT | Mod: CPTII,S$GLB,, | Performed by: FAMILY MEDICINE

## 2024-09-16 PROCEDURE — 82607 VITAMIN B-12: CPT | Performed by: FAMILY MEDICINE

## 2024-09-16 PROCEDURE — 99396 PREV VISIT EST AGE 40-64: CPT | Mod: S$GLB,,, | Performed by: FAMILY MEDICINE

## 2024-09-16 PROCEDURE — 3078F DIAST BP <80 MM HG: CPT | Mod: CPTII,S$GLB,, | Performed by: FAMILY MEDICINE

## 2024-09-16 PROCEDURE — 1160F RVW MEDS BY RX/DR IN RCRD: CPT | Mod: CPTII,S$GLB,, | Performed by: FAMILY MEDICINE

## 2024-09-16 PROCEDURE — 82728 ASSAY OF FERRITIN: CPT | Performed by: FAMILY MEDICINE

## 2024-09-16 PROCEDURE — 3075F SYST BP GE 130 - 139MM HG: CPT | Mod: CPTII,S$GLB,, | Performed by: FAMILY MEDICINE

## 2024-09-16 PROCEDURE — 85025 COMPLETE CBC W/AUTO DIFF WBC: CPT | Performed by: FAMILY MEDICINE

## 2024-09-16 PROCEDURE — 82043 UR ALBUMIN QUANTITATIVE: CPT | Performed by: FAMILY MEDICINE

## 2024-09-16 RX ORDER — SILDENAFIL 50 MG/1
50 TABLET, FILM COATED ORAL DAILY PRN
Qty: 15 TABLET | Refills: 3 | Status: SHIPPED | OUTPATIENT
Start: 2024-09-16 | End: 2024-12-15

## 2024-09-16 NOTE — PROGRESS NOTES
Subjective:     Patient ID: Claudio Herr is a 44 y.o. male.   Chief Complaint: Establish Care    HPI:  Patient presents to establish care.  Patient is due for annual exam and labs.    Has dx T2DM  Last A1c 5.0 (07/2023)  Renal function normal (07/2023)  Lipids at goal (07/2023)    Current regimen  Trulicity 1.5mg q week    Comorbidities/Meds  HTN: amlodipine 5mg qd, HCTZ 25mg qd, losartan 100mg qd  HLP: atorvastatin 40mg    Due for Foot exam  Due for eye exam    Acute c/o bothersome ED sx, slowly appeared over time and are becoming more disruptive. No prior tx for this.    Review of Problems & History:  Patient Active Problem List   Diagnosis    Hyperlipidemia    HTN (hypertension), benign    Vitamin D deficiency    Mixed dyslipidemia    Type 2 diabetes mellitus with microalbuminuria, without long-term current use of insulin    Snoring      Past Medical History:   Diagnosis Date    Diabetes mellitus type II     Hyperlipidemia     Hypertension     Obesity (BMI 30.0-34.9) 07/13/2017    Vitamin D deficiency disease       History reviewed. No pertinent surgical history.   Social History     Socioeconomic History    Marital status: Single   Tobacco Use    Smoking status: Every Day     Current packs/day: 0.33     Average packs/day: 0.3 packs/day for 8.0 years (2.6 ttl pk-yrs)     Types: Cigarettes    Smokeless tobacco: Never   Substance and Sexual Activity    Alcohol use: Yes     Comment: occasional    Drug use: No    Sexual activity: Yes     Partners: Female     Birth control/protection: None     Comment:    Social History Narrative    Lives with wife, 4 children. Works for [employer].    Not sedentary at work.            Medication Review:    Current Outpatient Medications:     amLODIPine (NORVASC) 5 MG tablet, Take 1 tablet by mouth once daily, Disp: 90 tablet, Rfl: 2    atorvastatin (LIPITOR) 40 MG tablet, Take 1 tablet by mouth once daily, Disp: 90 tablet, Rfl: 2    dulaglutide (TRULICITY) 1.5 mg/0.5  mL pen injector, INJECT 1.5 MG SUBCUTANEOUSLY EVERY 7 DAYS, Disp: 4 Pen, Rfl: 6    hydroCHLOROthiazide (HYDRODIURIL) 25 MG tablet, Take 1 tablet by mouth once daily, Disp: 90 tablet, Rfl: 2    losartan (COZAAR) 100 MG tablet, Take 1 tablet by mouth once daily, Disp: 90 tablet, Rfl: 2    triamcinolone acetonide 0.1% (KENALOG) 0.1 % cream, Apply topically 2 (two) times daily., Disp: 80 g, Rfl: 0    blood sugar diagnostic Strp, 1 strip by Misc.(Non-Drug; Combo Route) route once daily. (Patient not taking: Reported on 9/7/2023), Disp: 100 strip, Rfl: 11    blood-glucose meter kit, Use as instructed, Disp: 1 each, Rfl: 0    lancets Misc, 1 lancet by Misc.(Non-Drug; Combo Route) route once daily. (Patient not taking: Reported on 9/7/2023), Disp: 100 each, Rfl: 11    lancing device Misc, 1 Device by Misc.(Non-Drug; Combo Route) route once daily., Disp: 1 each, Rfl: 0    sildenafiL (VIAGRA) 50 MG tablet, Take 1 tablet (50 mg total) by mouth daily as needed for Erectile Dysfunction., Disp: 15 tablet, Rfl: 3     Review of Systems   Constitutional:  Negative for chills, fatigue and fever.   HENT:  Negative for nasal congestion, ear pain, postnasal drip and sore throat.    Eyes:  Negative for visual disturbance.   Respiratory:  Negative for cough, shortness of breath and wheezing.    Cardiovascular:  Negative for chest pain and palpitations.   Gastrointestinal:  Negative for abdominal pain, change in bowel habit, constipation, diarrhea, nausea and vomiting.   Genitourinary:  Positive for erectile dysfunction. Negative for dysuria and hematuria.   Musculoskeletal:  Negative for back pain, leg pain and neck pain.   Neurological:  Negative for dizziness, numbness and headaches.   Hematological:  Negative for adenopathy.   Psychiatric/Behavioral:  Negative for dysphoric mood, sleep disturbance and suicidal ideas. The patient is not nervous/anxious.           Objective:      Vitals:    09/16/24 0934   BP: 130/70   BP Location: Right  "arm   Patient Position: Sitting   BP Method: Large (Manual)   Pulse: 84   SpO2: 99%   Weight: 107.3 kg (236 lb 8.9 oz)   Height: 6' 3" (1.905 m)      Physical Exam  Vitals reviewed.   Constitutional:       General: He is not in acute distress.     Appearance: Normal appearance. He is not ill-appearing.   HENT:      Head: Normocephalic and atraumatic.      Right Ear: Tympanic membrane, ear canal and external ear normal. There is no impacted cerumen.      Left Ear: Tympanic membrane, ear canal and external ear normal. There is no impacted cerumen.      Nose: Nose normal. No congestion or rhinorrhea.      Mouth/Throat:      Mouth: Mucous membranes are moist.      Pharynx: Oropharynx is clear. No oropharyngeal exudate or posterior oropharyngeal erythema.   Eyes:      General: No scleral icterus.        Right eye: No discharge.         Left eye: No discharge.      Conjunctiva/sclera: Conjunctivae normal.   Neck:      Thyroid: No thyroid mass, thyromegaly or thyroid tenderness.   Cardiovascular:      Rate and Rhythm: Normal rate and regular rhythm.      Pulses: Normal pulses.           Dorsalis pedis pulses are 2+ on the right side and 2+ on the left side.        Posterior tibial pulses are 2+ on the right side and 2+ on the left side.      Heart sounds: Normal heart sounds. No murmur heard.     No friction rub. No gallop.   Pulmonary:      Effort: Pulmonary effort is normal. No respiratory distress.      Breath sounds: Normal breath sounds. No stridor. No wheezing, rhonchi or rales.   Abdominal:      General: Abdomen is flat. Bowel sounds are normal. There is no distension.      Palpations: Abdomen is soft. There is no mass.      Tenderness: There is no abdominal tenderness. There is no guarding.      Hernia: No hernia is present.   Musculoskeletal:         General: No swelling or deformity. Normal range of motion.      Cervical back: Normal range of motion and neck supple. No tenderness.   Feet:      Right foot:      " Protective Sensation: 5 sites tested.  5 sites sensed.      Left foot:      Protective Sensation: 5 sites tested.  5 sites sensed.   Lymphadenopathy:      Cervical: No cervical adenopathy.   Skin:     General: Skin is warm and dry.   Neurological:      General: No focal deficit present.      Mental Status: He is alert and oriented to person, place, and time. Mental status is at baseline.      Gait: Gait normal.      Deep Tendon Reflexes: Reflexes normal.   Psychiatric:         Mood and Affect: Mood normal.         Behavior: Behavior normal.         Thought Content: Thought content normal.         Judgment: Judgment normal.           Assessment:       Problem List Items Addressed This Visit          Cardiac/Vascular    Hyperlipidemia    Relevant Orders    Comprehensive Metabolic Panel    Lipid Panel    HTN (hypertension), benign       Endocrine    Vitamin D deficiency    Relevant Orders    Vitamin D    Type 2 diabetes mellitus with microalbuminuria, without long-term current use of insulin    Relevant Orders    Comprehensive Metabolic Panel    Hemoglobin A1C    Microalbumin/Creatinine Ratio, Urine    Diabetic Eye Screening Photo    [facility] DIABETES FOOT EXAM (Completed)     Other Visit Diagnoses       Encounter for annual general medical examination without abnormal findings in adult    -  Primary    Relevant Orders    Comprehensive Metabolic Panel    CBC Auto Differential    Lipid Panel    Hemoglobin A1C    TSH    T4, Free    Encounter to establish care with new doctor        Anemia, unspecified type        Relevant Orders    Vitamin B12    FOLATE    FERRITIN    Erectile dysfunction, unspecified erectile dysfunction type        Relevant Medications    sildenafiL (VIAGRA) 50 MG tablet              Plan:       1. Encounter for annual general medical examination without abnormal findings in adult  Health maintenance updated  Chronic issues reviewed  Fasting labs ordered  - Comprehensive Metabolic Panel; Future  - CBC Auto  Differential; Future  - Lipid Panel; Future  - Hemoglobin A1C; Future  - TSH; Future  - T4, Free; Future    2. Encounter to establish care with new doctor  Reviewed chronic medical conditions, updated problem list and medication list    3. Type 2 diabetes mellitus with microalbuminuria, without long-term current use of insulin  Previously very well controlled; f/u new labs  Continue trulicity as is for now  If A1c bumps consider changing to different GLP-1RA vs adding other agents  Foot exam accomplished  Ref for eye exam  - Comprehensive Metabolic Panel; Future  - Hemoglobin A1C; Future  - Microalbumin/Creatinine Ratio, Urine; Future  - Diabetic Eye Screening Photo; Future  -  DIABETES FOOT EXAM    4. Mixed hyperlipidemia  Stable, previously at goal  F/u FLP  Continue atorvastatin 40mg  - Comprehensive Metabolic Panel; Future  - Lipid Panel; Future    5. HTN (hypertension), benign  Stable, controlled  Continue HCTZ, amlodipine, losartan    6. Vitamin D deficiency  Noted on hx  F/u lab, treat as indicated  - Vitamin D; Future    7. Anemia, unspecified type  Noted in prior labs  Checking labs today, will address as indicated  - Vitamin B12; Future  - FOLATE; Future  - FERRITIN; Future    8. Erectile dysfunction, unspecified erectile dysfunction type  Discussed r/b/a for treatment and potential causes  Trial viagra 50mg PRN  - sildenafiL (VIAGRA) 50 MG tablet; Take 1 tablet (50 mg total) by mouth daily as needed for Erectile Dysfunction.  Dispense: 15 tablet; Refill: 3          Follow up in about 6 months (around 3/16/2025) for Diabetic Visit.     Flavio Blanca MD, FAAFP  Family Medicine Physician  [facility]  09/16/2024       This note was produced using voice recognition technology. Some typographical or syntax errors may be present, despite best efforts with proofreading.

## 2024-09-16 NOTE — LETTER
September 16, 2024      [facility]  [address]  [facility] [address]  Phone: [phone]  Fax: [phone]       Patient: Claudio Herr   YOB: 1980  Date of Visit: 09/16/2024    To Whom It May Concern:    Jacki Herr  was at [facility] on 09/16/2024. The patient may return to work/school on 09/17/2024 with no restrictions. If you have any questions or concerns, or if I can be of further assistance, please do not hesitate to contact me.    Sincerely,        Flavio Blanca MD, FAAFP  Family Medicine Physician  [facility]

## 2024-09-17 DIAGNOSIS — D72.829 LEUKOCYTOSIS, UNSPECIFIED TYPE: Primary | ICD-10-CM

## 2025-04-02 DIAGNOSIS — E11.9 TYPE 2 DIABETES MELLITUS WITHOUT COMPLICATION: ICD-10-CM

## 2025-05-05 ENCOUNTER — OFFICE VISIT (OUTPATIENT)
Dept: INTERNAL MEDICINE | Facility: CLINIC | Age: 45
End: 2025-05-05
Payer: COMMERCIAL

## 2025-05-05 ENCOUNTER — LAB VISIT (OUTPATIENT)
Dept: LAB | Facility: HOSPITAL | Age: 45
End: 2025-05-05
Attending: FAMILY MEDICINE
Payer: COMMERCIAL

## 2025-05-05 VITALS
DIASTOLIC BLOOD PRESSURE: 82 MMHG | SYSTOLIC BLOOD PRESSURE: 136 MMHG | WEIGHT: 237 LBS | OXYGEN SATURATION: 96 % | HEART RATE: 85 BPM | BODY MASS INDEX: 29.47 KG/M2 | HEIGHT: 75 IN

## 2025-05-05 DIAGNOSIS — I10 HTN (HYPERTENSION), BENIGN: ICD-10-CM

## 2025-05-05 DIAGNOSIS — E78.2 MIXED DYSLIPIDEMIA: ICD-10-CM

## 2025-05-05 DIAGNOSIS — E78.2 MIXED HYPERLIPIDEMIA: ICD-10-CM

## 2025-05-05 DIAGNOSIS — E11.29 TYPE 2 DIABETES MELLITUS WITH MICROALBUMINURIA, WITHOUT LONG-TERM CURRENT USE OF INSULIN: ICD-10-CM

## 2025-05-05 DIAGNOSIS — E78.2 MIXED HYPERLIPIDEMIA: Primary | ICD-10-CM

## 2025-05-05 DIAGNOSIS — R80.9 TYPE 2 DIABETES MELLITUS WITH MICROALBUMINURIA, WITHOUT LONG-TERM CURRENT USE OF INSULIN: ICD-10-CM

## 2025-05-05 DIAGNOSIS — R53.83 FATIGUE, UNSPECIFIED TYPE: ICD-10-CM

## 2025-05-05 DIAGNOSIS — G89.29 CHRONIC NONINTRACTABLE HEADACHE, UNSPECIFIED HEADACHE TYPE: ICD-10-CM

## 2025-05-05 DIAGNOSIS — R51.9 CHRONIC NONINTRACTABLE HEADACHE, UNSPECIFIED HEADACHE TYPE: ICD-10-CM

## 2025-05-05 DIAGNOSIS — I10 ESSENTIAL HYPERTENSION: ICD-10-CM

## 2025-05-05 LAB
ABSOLUTE EOSINOPHIL (OHS): 0.08 K/UL
ABSOLUTE MONOCYTE (OHS): 0.71 K/UL (ref 0.3–1)
ABSOLUTE NEUTROPHIL COUNT (OHS): 6.89 K/UL (ref 1.8–7.7)
ALBUMIN SERPL BCP-MCNC: 3.7 G/DL (ref 3.5–5.2)
ALP SERPL-CCNC: 68 UNIT/L (ref 40–150)
ALT SERPL W/O P-5'-P-CCNC: 66 UNIT/L (ref 10–44)
ANION GAP (OHS): 8 MMOL/L (ref 8–16)
AST SERPL-CCNC: 42 UNIT/L (ref 11–45)
BASOPHILS # BLD AUTO: 0.06 K/UL
BASOPHILS NFR BLD AUTO: 0.6 %
BILIRUB SERPL-MCNC: 0.4 MG/DL (ref 0.1–1)
BUN SERPL-MCNC: 17 MG/DL (ref 6–20)
CALCIUM SERPL-MCNC: 9.3 MG/DL (ref 8.7–10.5)
CHLORIDE SERPL-SCNC: 102 MMOL/L (ref 95–110)
CHOLEST SERPL-MCNC: 125 MG/DL (ref 120–199)
CHOLEST/HDLC SERPL: 2.6 {RATIO} (ref 2–5)
CO2 SERPL-SCNC: 28 MMOL/L (ref 23–29)
CREAT SERPL-MCNC: 0.9 MG/DL (ref 0.5–1.4)
EAG (OHS): 100 MG/DL (ref 68–131)
ERYTHROCYTE [DISTWIDTH] IN BLOOD BY AUTOMATED COUNT: 14.2 % (ref 11.5–14.5)
FERRITIN SERPL-MCNC: 88 NG/ML (ref 20–300)
FOLATE SERPL-MCNC: 6.7 NG/ML (ref 4–24)
GFR SERPLBLD CREATININE-BSD FMLA CKD-EPI: >60 ML/MIN/1.73/M2
GLUCOSE SERPL-MCNC: 78 MG/DL (ref 70–110)
HBA1C MFR BLD: 5.1 % (ref 4–5.6)
HCT VFR BLD AUTO: 42.7 % (ref 40–54)
HDLC SERPL-MCNC: 49 MG/DL (ref 40–75)
HDLC SERPL: 39.2 % (ref 20–50)
HGB BLD-MCNC: 14 GM/DL (ref 14–18)
IMM GRANULOCYTES # BLD AUTO: 0.03 K/UL (ref 0–0.04)
IMM GRANULOCYTES NFR BLD AUTO: 0.3 % (ref 0–0.5)
LDLC SERPL CALC-MCNC: 65.6 MG/DL (ref 63–159)
LYMPHOCYTES # BLD AUTO: 2.01 K/UL (ref 1–4.8)
MCH RBC QN AUTO: 31.7 PG (ref 27–31)
MCHC RBC AUTO-ENTMCNC: 32.8 G/DL (ref 32–36)
MCV RBC AUTO: 97 FL (ref 82–98)
NONHDLC SERPL-MCNC: 76 MG/DL
NUCLEATED RBC (/100WBC) (OHS): 0 /100 WBC
PLATELET # BLD AUTO: 264 K/UL (ref 150–450)
PMV BLD AUTO: 10.5 FL (ref 9.2–12.9)
POTASSIUM SERPL-SCNC: 3.8 MMOL/L (ref 3.5–5.1)
PROT SERPL-MCNC: 7.6 GM/DL (ref 6–8.4)
RBC # BLD AUTO: 4.41 M/UL (ref 4.6–6.2)
RELATIVE EOSINOPHIL (OHS): 0.8 %
RELATIVE LYMPHOCYTE (OHS): 20.6 % (ref 18–48)
RELATIVE MONOCYTE (OHS): 7.3 % (ref 4–15)
RELATIVE NEUTROPHIL (OHS): 70.4 % (ref 38–73)
SODIUM SERPL-SCNC: 138 MMOL/L (ref 136–145)
TESTOST SERPL-MCNC: 839 NG/DL (ref 304–1227)
TRIGL SERPL-MCNC: 52 MG/DL (ref 30–150)
VIT B12 SERPL-MCNC: 610 PG/ML (ref 210–950)
WBC # BLD AUTO: 9.78 K/UL (ref 3.9–12.7)

## 2025-05-05 PROCEDURE — 1159F MED LIST DOCD IN RCRD: CPT | Mod: CPTII,S$GLB,, | Performed by: FAMILY MEDICINE

## 2025-05-05 PROCEDURE — 36415 COLL VENOUS BLD VENIPUNCTURE: CPT

## 2025-05-05 PROCEDURE — 85025 COMPLETE CBC W/AUTO DIFF WBC: CPT

## 2025-05-05 PROCEDURE — 82728 ASSAY OF FERRITIN: CPT

## 2025-05-05 PROCEDURE — 82310 ASSAY OF CALCIUM: CPT

## 2025-05-05 PROCEDURE — 82607 VITAMIN B-12: CPT

## 2025-05-05 PROCEDURE — 99999 PR PBB SHADOW E&M-EST. PATIENT-LVL III: CPT | Mod: PBBFAC,,, | Performed by: FAMILY MEDICINE

## 2025-05-05 PROCEDURE — 82746 ASSAY OF FOLIC ACID SERUM: CPT

## 2025-05-05 PROCEDURE — 99214 OFFICE O/P EST MOD 30 MIN: CPT | Mod: S$GLB,,, | Performed by: FAMILY MEDICINE

## 2025-05-05 PROCEDURE — 3079F DIAST BP 80-89 MM HG: CPT | Mod: CPTII,S$GLB,, | Performed by: FAMILY MEDICINE

## 2025-05-05 PROCEDURE — G2211 COMPLEX E/M VISIT ADD ON: HCPCS | Mod: S$GLB,,, | Performed by: FAMILY MEDICINE

## 2025-05-05 PROCEDURE — 82465 ASSAY BLD/SERUM CHOLESTEROL: CPT

## 2025-05-05 PROCEDURE — 83036 HEMOGLOBIN GLYCOSYLATED A1C: CPT

## 2025-05-05 PROCEDURE — 3008F BODY MASS INDEX DOCD: CPT | Mod: CPTII,S$GLB,, | Performed by: FAMILY MEDICINE

## 2025-05-05 PROCEDURE — 3075F SYST BP GE 130 - 139MM HG: CPT | Mod: CPTII,S$GLB,, | Performed by: FAMILY MEDICINE

## 2025-05-05 PROCEDURE — 84403 ASSAY OF TOTAL TESTOSTERONE: CPT

## 2025-05-05 PROCEDURE — 4010F ACE/ARB THERAPY RXD/TAKEN: CPT | Mod: CPTII,S$GLB,, | Performed by: FAMILY MEDICINE

## 2025-05-05 RX ORDER — HYDROCHLOROTHIAZIDE 25 MG/1
25 TABLET ORAL
Qty: 90 TABLET | Refills: 0 | OUTPATIENT
Start: 2025-05-05

## 2025-05-05 RX ORDER — LOSARTAN POTASSIUM 100 MG/1
100 TABLET ORAL
Qty: 90 TABLET | Refills: 0 | OUTPATIENT
Start: 2025-05-05

## 2025-05-05 RX ORDER — ATORVASTATIN CALCIUM 40 MG/1
40 TABLET, FILM COATED ORAL DAILY
Qty: 90 TABLET | Refills: 1 | Status: SHIPPED | OUTPATIENT
Start: 2025-05-05 | End: 2025-11-01

## 2025-05-05 RX ORDER — LOSARTAN POTASSIUM 100 MG/1
100 TABLET ORAL DAILY
Qty: 90 TABLET | Refills: 1 | Status: SHIPPED | OUTPATIENT
Start: 2025-05-05 | End: 2025-11-01

## 2025-05-05 RX ORDER — ATORVASTATIN CALCIUM 40 MG/1
40 TABLET, FILM COATED ORAL DAILY
Qty: 90 TABLET | Refills: 1 | Status: SHIPPED | OUTPATIENT
Start: 2025-05-05 | End: 2025-05-05

## 2025-05-05 RX ORDER — AMLODIPINE BESYLATE 5 MG/1
5 TABLET ORAL DAILY
Qty: 90 TABLET | Refills: 1 | Status: SHIPPED | OUTPATIENT
Start: 2025-05-05 | End: 2025-11-01

## 2025-05-05 RX ORDER — HYDROCHLOROTHIAZIDE 25 MG/1
25 TABLET ORAL DAILY
Qty: 90 TABLET | Refills: 1 | Status: SHIPPED | OUTPATIENT
Start: 2025-05-05 | End: 2025-05-05

## 2025-05-05 RX ORDER — HYDROCHLOROTHIAZIDE 25 MG/1
25 TABLET ORAL DAILY
Qty: 90 TABLET | Refills: 1 | Status: SHIPPED | OUTPATIENT
Start: 2025-05-05 | End: 2025-11-01

## 2025-05-05 RX ORDER — AMLODIPINE BESYLATE 5 MG/1
5 TABLET ORAL
Qty: 90 TABLET | Refills: 0 | OUTPATIENT
Start: 2025-05-05

## 2025-05-05 RX ORDER — DULAGLUTIDE 1.5 MG/.5ML
INJECTION, SOLUTION SUBCUTANEOUS
Qty: 12 PEN | Refills: 1 | Status: SHIPPED | OUTPATIENT
Start: 2025-05-05 | End: 2025-05-05

## 2025-05-05 RX ORDER — TIZANIDINE 2 MG/1
2 TABLET ORAL EVERY 8 HOURS PRN
Qty: 30 TABLET | Refills: 1 | Status: SHIPPED | OUTPATIENT
Start: 2025-05-05

## 2025-05-05 RX ORDER — ATORVASTATIN CALCIUM 40 MG/1
40 TABLET, FILM COATED ORAL
Qty: 90 TABLET | Refills: 0 | OUTPATIENT
Start: 2025-05-05

## 2025-05-05 RX ORDER — AMLODIPINE BESYLATE 5 MG/1
5 TABLET ORAL DAILY
Qty: 90 TABLET | Refills: 1 | Status: SHIPPED | OUTPATIENT
Start: 2025-05-05 | End: 2025-05-05

## 2025-05-05 RX ORDER — LOSARTAN POTASSIUM 100 MG/1
100 TABLET ORAL DAILY
Qty: 90 TABLET | Refills: 1 | Status: SHIPPED | OUTPATIENT
Start: 2025-05-05 | End: 2025-05-05

## 2025-05-05 NOTE — LETTER
May 5, 2025      [facility]  [address]  [facility] [address]  Phone: [phone]  Fax: [phone]       Patient: Claudio Herr   YOB: 1980  Date of Visit: 05/05/2025    To Whom It May Concern:    Claudio Herr  was at [facility] on 05/05/2025. The patient may return to work/school on 05/06/2025 with no restrictions. If you have any questions or concerns, or if I can be of further assistance, please do not hesitate to contact me.    Sincerely,        Flavio Blanca MD, FAAFP  Family Medicine Physician  [facility]

## 2025-05-05 NOTE — PROGRESS NOTES
[facility]  Primary Care Office Visit    Patient Information  Claudio Herr  45 y.o. male    PCP  Flavio Blanca MD    Reason for Visit  Diabetes      Subjective:  History of Present Illness    CHIEF COMPLAINT:  Patient presents today for diabetic follow-up    He reports not checking blood sugar regularly. Currently taking Trulicity for diabetes management with history of previous Novolog use.    He reports experiencing fatigue and low energy for approximately 1.5 months, describing feeling sluggish and lacking motivation. He needs to rest after activities before being able to continue tasks. He reports weight gain and notes that Trulicity is no longer effective in controlling food cravings.    He experiences daily headaches with pain levels reaching 5-6/10, occurring throughout the day and sometimes in the mornings. He denies light sensitivity, throbbing sensation, or pulsating quality. He takes multiple Goodie powder doses daily for management          Review of Systems   Constitutional:  Negative for chills and fever.   HENT:  Negative for nasal congestion, ear pain and sore throat.    Eyes:  Negative for visual disturbance.   Respiratory:  Negative for cough and shortness of breath.    Cardiovascular:  Negative for chest pain.   Gastrointestinal:  Negative for constipation, diarrhea, nausea and vomiting.   Genitourinary:  Negative for dysuria and frequency.   Integumentary:  Negative for rash.   Neurological:  Positive for headaches.   Psychiatric/Behavioral:  Negative for suicidal ideas.    All other systems reviewed and are negative.       Patient answers are not available for this visit.      Problem List and History  Problem List[1]  Past Medical History[2]  Past Surgical History[3]    Medication List  Current Medications[4]    Objective:  Vitals:    05/05/25 0843 05/05/25 0905   BP: (!) 140/90 136/82   BP Location: Right arm    Patient Position: Sitting    Pulse: 85   "  SpO2: 96%    Weight: 107.5 kg (236 lb 15.9 oz)    Height: 6' 3" (1.905 m)        Physical Exam  Constitutional:       General: He is not in acute distress.     Appearance: Normal appearance. He is not ill-appearing.   HENT:      Head: Normocephalic and atraumatic.      Right Ear: External ear normal. No middle ear effusion.      Left Ear: External ear normal.  No middle ear effusion.      Nose: Nose normal. No congestion or rhinorrhea.      Mouth/Throat:      Mouth: Mucous membranes are moist.      Pharynx: Oropharynx is clear. No oropharyngeal exudate or posterior oropharyngeal erythema.   Eyes:      General: No scleral icterus.        Right eye: No discharge.         Left eye: No discharge.      Conjunctiva/sclera: Conjunctivae normal.   Cardiovascular:      Rate and Rhythm: Normal rate and regular rhythm.      Heart sounds: Normal heart sounds. No murmur heard.     No friction rub. No gallop.   Pulmonary:      Effort: Pulmonary effort is normal. No respiratory distress.      Breath sounds: Normal breath sounds. No wheezing, rhonchi or rales.   Abdominal:      General: Bowel sounds are normal. There is no distension.      Palpations: Abdomen is soft. There is no mass.      Tenderness: There is no abdominal tenderness. There is no guarding.   Musculoskeletal:         General: No swelling or deformity. Normal range of motion.      Cervical back: Normal range of motion and neck supple.   Lymphadenopathy:      Cervical: No cervical adenopathy.   Skin:     General: Skin is warm and dry.   Neurological:      General: No focal deficit present.      Mental Status: He is alert and oriented to person, place, and time. Mental status is at baseline.   Psychiatric:         Mood and Affect: Mood normal.         Behavior: Behavior normal.         Thought Content: Thought content normal.         Judgment: Judgment normal.         Assessment and Plan:    ICD-10-CM ICD-9-CM   1. Mixed hyperlipidemia  E78.2 272.2   2. Type 2 " diabetes mellitus with microalbuminuria, without long-term current use of insulin  E11.29 250.40    R80.9 791.0   3. HTN (hypertension), benign  I10 401.1   4. Chronic nonintractable headache, unspecified headache type  R51.9 784.0    G89.29    5. Fatigue, unspecified type  R53.83 780.79     Orders Placed This Encounter    Hemoglobin A1C    Lipid Panel    Comprehensive Metabolic Panel    Ferritin    Vitamin B12    Folate    Testosterone,Total    CBC Auto Differential    Diabetic Eye Screening Photo - Automated Analysis    tiZANidine (ZANAFLEX) 2 MG tablet    amLODIPine (NORVASC) 5 MG tablet    atorvastatin (LIPITOR) 40 MG tablet    hydroCHLOROthiazide (HYDRODIURIL) 25 MG tablet    losartan (COZAAR) 100 MG tablet       Assessment & Plan    - Assessed reported headaches, considering potential causes including BP, blood sugar, stress, and medication overuse.  - A1C results show excellent control of diabetes.  - Considered changing diabetes medication from Trulicity to Mounjaro or Ozempic to help with appetite control and weight management.  - Diagnosed tension-type headaches likely due to stress-induced muscle tension.    1. Type 2 diabetes mellitus with microalbuminuria, without long-term current use of insulin  Previously very well controlled  F/u new A1c  Plan to change to Mounjaro or Ozempic after lab; hopefully will help with appetite and weight better than Trulicity has  Eye photo ordered  Foot exam and Urine at next visit  - Hemoglobin A1C; Future  - Lipid Panel; Future  - Comprehensive Metabolic Panel; Future  - Diabetic Eye Screening Photo - Automated Analysis; Future    2. Mixed hyperlipidemia (Primary)  Well controlled on last check  F/u new FLP  Continue atorvastatin 40mg, refill sent  - Lipid Panel; Future  - Comprehensive Metabolic Panel; Future  - atorvastatin (LIPITOR) 40 MG tablet; Take 1 tablet (40 mg total) by mouth once daily.  Dispense: 90 tablet; Refill: 1    3. HTN (hypertension), benign  Will  need to monitor for control, a bit high on intake  Continue amlodipine, HCTZ, losartan  Unlikely these are directly contributing to headaches; if anything would consider dehydration from HCTZ as potential contributor  Recommend good hydration to offset this  Continue rx as is, refills sent  - amLODIPine (NORVASC) 5 MG tablet; Take 1 tablet (5 mg total) by mouth once daily.  Dispense: 90 tablet; Refill: 1  - hydroCHLOROthiazide (HYDRODIURIL) 25 MG tablet; Take 1 tablet (25 mg total) by mouth once daily.  Dispense: 90 tablet; Refill: 1  - losartan (COZAAR) 100 MG tablet; Take 1 tablet (100 mg total) by mouth once daily.  Dispense: 90 tablet; Refill: 1    4. Chronic nonintractable headache, unspecified headache type  Suspect multifactorial: stress, tension-type, possibly BP control, and HA med overuse  Will try to shift away from NSAIDs and use zanaflex as an alternative; cautioned on potential drowsy side effect, recommend trial on a non-work day or later in the evening  Can still use NSAIDs or tylenol but recommend limiting usage to prevent overuse HA  - tiZANidine (ZANAFLEX) 2 MG tablet; Take 1 tablet (2 mg total) by mouth every 8 (eight) hours as needed.  Dispense: 30 tablet; Refill: 1    5. Fatigue, unspecified type  Checking labs today, will address deficiencies as indicated  - Comprehensive Metabolic Panel; Future  - Ferritin; Future  - Vitamin B12; Future  - Folate; Future  - Testosterone,Total; Future  - CBC Auto Differential; Future        Check-Out:  Follow-Up  Follow up in about 6 months (around 11/5/2025) for Annual Visit.    Upcoming Appointments  Future Appointments   Date Time Provider Department Center   5/6/2025 10:30 AM [facility] INTERNAL MEDICINE [facility]   11/5/2025  8:00 AM Flavio Blanca MD [facility] Blanca MD, FAAFP  Family Medicine Physician  [facility]  5/5/2025      This note was generated with the assistance  of [employer] technology. Verbal consent was obtained by the patient and accompanying visitor(s) for the recording of patient appointment to facilitate this note. I attest to having reviewed and edited the generated note for accuracy, though some syntax or spelling errors may persist. Please contact the author of this note for any clarification.                                   [1]   Patient Active Problem List  Diagnosis    Hyperlipidemia    HTN (hypertension), benign    Vitamin D deficiency    Type 2 diabetes mellitus with microalbuminuria, without long-term current use of insulin    Snoring    Chronic nonintractable headache   [2]   Past Medical History:  Diagnosis Date    Diabetes mellitus type II     Hyperlipidemia     Hypertension     Obesity (BMI 30.0-34.9) 07/13/2017    Vitamin D deficiency disease    [3] No past surgical history on file.  [4]   Current Outpatient Medications:     blood sugar diagnostic Strp, 1 strip by Misc.(Non-Drug; Combo Route) route once daily., Disp: 100 strip, Rfl: 11    lancets Misc, 1 lancet by Misc.(Non-Drug; Combo Route) route once daily., Disp: 100 each, Rfl: 11    triamcinolone acetonide 0.1% (KENALOG) 0.1 % cream, Apply topically 2 (two) times daily., Disp: 80 g, Rfl: 0    amLODIPine (NORVASC) 5 MG tablet, Take 1 tablet (5 mg total) by mouth once daily., Disp: 90 tablet, Rfl: 1    atorvastatin (LIPITOR) 40 MG tablet, Take 1 tablet (40 mg total) by mouth once daily., Disp: 90 tablet, Rfl: 1    blood-glucose meter kit, Use as instructed, Disp: 1 each, Rfl: 0    hydroCHLOROthiazide (HYDRODIURIL) 25 MG tablet, Take 1 tablet (25 mg total) by mouth once daily., Disp: 90 tablet, Rfl: 1    lancing device Misc, 1 Device by Misc.(Non-Drug; Combo Route) route once daily., Disp: 1 each, Rfl: 0    losartan (COZAAR) 100 MG tablet, Take 1 tablet (100 mg total) by mouth once daily., Disp: 90 tablet, Rfl: 1    sildenafiL (VIAGRA) 50 MG tablet, Take 1 tablet (50 mg total) by mouth daily  as needed for Erectile Dysfunction., Disp: 15 tablet, Rfl: 3    tiZANidine (ZANAFLEX) 2 MG tablet, Take 1 tablet (2 mg total) by mouth every 8 (eight) hours as needed., Disp: 30 tablet, Rfl: 1

## 2025-05-06 ENCOUNTER — CLINICAL SUPPORT (OUTPATIENT)
Dept: INTERNAL MEDICINE | Facility: CLINIC | Age: 45
End: 2025-05-06
Attending: FAMILY MEDICINE
Payer: COMMERCIAL

## 2025-05-06 DIAGNOSIS — Z01.00 ENCOUNTER FOR ROUTINE EYE AND VISION EXAMINATION: Primary | ICD-10-CM

## 2025-05-06 DIAGNOSIS — R80.9 TYPE 2 DIABETES MELLITUS WITH MICROALBUMINURIA, WITHOUT LONG-TERM CURRENT USE OF INSULIN: ICD-10-CM

## 2025-05-06 DIAGNOSIS — E11.29 TYPE 2 DIABETES MELLITUS WITH MICROALBUMINURIA, WITHOUT LONG-TERM CURRENT USE OF INSULIN: ICD-10-CM

## 2025-05-07 ENCOUNTER — OFFICE VISIT (OUTPATIENT)
Dept: OPTOMETRY | Facility: CLINIC | Age: 45
End: 2025-05-07
Payer: COMMERCIAL

## 2025-05-07 DIAGNOSIS — R80.9 TYPE 2 DIABETES MELLITUS WITH MICROALBUMINURIA, WITHOUT LONG-TERM CURRENT USE OF INSULIN: ICD-10-CM

## 2025-05-07 DIAGNOSIS — E11.9 TYPE 2 DIABETES MELLITUS WITHOUT RETINOPATHY: Primary | ICD-10-CM

## 2025-05-07 DIAGNOSIS — E11.29 TYPE 2 DIABETES MELLITUS WITH MICROALBUMINURIA, WITHOUT LONG-TERM CURRENT USE OF INSULIN: ICD-10-CM

## 2025-05-07 DIAGNOSIS — H52.7 REFRACTIVE ERROR: ICD-10-CM

## 2025-05-07 PROCEDURE — 3044F HG A1C LEVEL LT 7.0%: CPT | Mod: CPTII,S$GLB,, | Performed by: OPTOMETRIST

## 2025-05-07 PROCEDURE — 99999 PR PBB SHADOW E&M-EST. PATIENT-LVL III: CPT | Mod: PBBFAC,,, | Performed by: OPTOMETRIST

## 2025-05-07 PROCEDURE — 4010F ACE/ARB THERAPY RXD/TAKEN: CPT | Mod: CPTII,S$GLB,, | Performed by: OPTOMETRIST

## 2025-05-07 PROCEDURE — 92015 DETERMINE REFRACTIVE STATE: CPT | Mod: S$GLB,,, | Performed by: OPTOMETRIST

## 2025-05-07 PROCEDURE — 1159F MED LIST DOCD IN RCRD: CPT | Mod: CPTII,S$GLB,, | Performed by: OPTOMETRIST

## 2025-05-07 PROCEDURE — 92014 COMPRE OPH EXAM EST PT 1/>: CPT | Mod: S$GLB,,, | Performed by: OPTOMETRIST

## 2025-05-07 NOTE — LETTER
May 7, 2025      [facility]  [address]  [address]  [place] [address]  Phone: [phone]  Fax: [phone]       Patient: Claudio Herr   YOB: 1980  Date of Visit: 05/07/2025    To Whom It May Concern:    Jakci Herr  was at [facility] on 05/07/2025. The patient may return to work/school on 05/07/2025 with no restrictions. If you have any questions or concerns, or if I can be of further assistance, please do not hesitate to contact me.    Sincerely,

## 2025-05-17 DIAGNOSIS — N52.9 ERECTILE DYSFUNCTION, UNSPECIFIED ERECTILE DYSFUNCTION TYPE: ICD-10-CM

## 2025-05-17 NOTE — TELEPHONE ENCOUNTER
No care due was identified.  [facility] Embedded Care Due Messages. Reference number: [id number].   5/17/2025 1:28:13 PM CDT

## 2025-05-19 RX ORDER — SILDENAFIL 50 MG/1
50 TABLET, FILM COATED ORAL DAILY PRN
Qty: 15 TABLET | Refills: 11 | Status: SHIPPED | OUTPATIENT
Start: 2025-05-19

## 2025-05-19 NOTE — TELEPHONE ENCOUNTER
Refill Decision Note   Claudio Herr  is requesting a refill authorization.  Brief Assessment and Rationale for Refill:  Approve     Medication Therapy Plan:        Comments:     Note composed:8:50 AM 05/19/2025

## 2025-06-04 ENCOUNTER — PATIENT MESSAGE (OUTPATIENT)
Dept: INTERNAL MEDICINE | Facility: CLINIC | Age: 45
End: 2025-06-04
Payer: COMMERCIAL

## 2025-06-04 NOTE — TELEPHONE ENCOUNTER
"Pt f/u on 5/5 office visit and labs; would like to know if med change is next    LOV with Flavio Blanca MD , 5/5/2025; visit note states  "He reports weight gain and notes that Trulicity is no longer effective in controlling food cravings.  1. Type 2 diabetes mellitus with microalbuminuria, without long-term current use of insulin  Previously very well controlled  F/u new A1c  Plan to change to Mounjaro or Ozempic after lab; hopefully will help with appetite and weight better than Trulicity"    "

## 2025-06-19 ENCOUNTER — PATIENT MESSAGE (OUTPATIENT)
Dept: INTERNAL MEDICINE | Facility: CLINIC | Age: 45
End: 2025-06-19
Payer: COMMERCIAL

## 2025-07-15 ENCOUNTER — TELEPHONE (OUTPATIENT)
Dept: INTERNAL MEDICINE | Facility: CLINIC | Age: 45
End: 2025-07-15
Payer: COMMERCIAL

## 2025-07-18 ENCOUNTER — TELEPHONE (OUTPATIENT)
Dept: INTERNAL MEDICINE | Facility: CLINIC | Age: 45
End: 2025-07-18
Payer: COMMERCIAL